# Patient Record
Sex: FEMALE | Race: WHITE | Employment: OTHER | URBAN - METROPOLITAN AREA
[De-identification: names, ages, dates, MRNs, and addresses within clinical notes are randomized per-mention and may not be internally consistent; named-entity substitution may affect disease eponyms.]

---

## 2020-01-01 ENCOUNTER — APPOINTMENT (OUTPATIENT)
Dept: GENERAL RADIOLOGY | Facility: HOSPITAL | Age: 75
DRG: 871 | End: 2020-01-01
Attending: INTERNAL MEDICINE
Payer: MEDICARE

## 2020-01-01 ENCOUNTER — APPOINTMENT (OUTPATIENT)
Dept: CT IMAGING | Facility: HOSPITAL | Age: 75
DRG: 871 | End: 2020-01-01
Attending: INTERNAL MEDICINE
Payer: MEDICARE

## 2020-01-01 ENCOUNTER — APPOINTMENT (OUTPATIENT)
Dept: GENERAL RADIOLOGY | Facility: HOSPITAL | Age: 75
DRG: 871 | End: 2020-01-01
Payer: MEDICARE

## 2020-01-01 ENCOUNTER — APPOINTMENT (OUTPATIENT)
Dept: GENERAL RADIOLOGY | Facility: HOSPITAL | Age: 75
DRG: 871 | End: 2020-01-01
Attending: EMERGENCY MEDICINE
Payer: MEDICARE

## 2020-01-01 ENCOUNTER — APPOINTMENT (OUTPATIENT)
Dept: GENERAL RADIOLOGY | Facility: HOSPITAL | Age: 75
DRG: 871 | End: 2020-01-01
Attending: NURSE PRACTITIONER
Payer: MEDICARE

## 2020-01-01 ENCOUNTER — APPOINTMENT (OUTPATIENT)
Dept: ULTRASOUND IMAGING | Facility: HOSPITAL | Age: 75
DRG: 871 | End: 2020-01-01
Attending: INTERNAL MEDICINE
Payer: MEDICARE

## 2020-01-01 ENCOUNTER — ANESTHESIA (OUTPATIENT)
Dept: MEDSURG UNIT | Facility: HOSPITAL | Age: 75
DRG: 871 | End: 2020-01-01
Payer: MEDICARE

## 2020-01-01 ENCOUNTER — APPOINTMENT (OUTPATIENT)
Dept: CT IMAGING | Facility: HOSPITAL | Age: 75
DRG: 871 | End: 2020-01-01
Attending: NURSE PRACTITIONER
Payer: MEDICARE

## 2020-01-01 ENCOUNTER — APPOINTMENT (OUTPATIENT)
Dept: INTERVENTIONAL RADIOLOGY/VASCULAR | Facility: HOSPITAL | Age: 75
DRG: 871 | End: 2020-01-01
Attending: INTERNAL MEDICINE
Payer: MEDICARE

## 2020-01-01 ENCOUNTER — ANESTHESIA EVENT (OUTPATIENT)
Dept: MEDSURG UNIT | Facility: HOSPITAL | Age: 75
DRG: 871 | End: 2020-01-01
Payer: MEDICARE

## 2020-01-01 ENCOUNTER — HOSPITAL ENCOUNTER (INPATIENT)
Dept: GENERAL RADIOLOGY | Facility: HOSPITAL | Age: 75
Discharge: HOME | DRG: 871 | End: 2020-01-01
Attending: HOSPITALIST
Payer: MEDICARE

## 2020-01-01 ENCOUNTER — HOSPITAL ENCOUNTER (INPATIENT)
Facility: HOSPITAL | Age: 75
LOS: 17 days | DRG: 871 | End: 2020-01-01
Attending: EMERGENCY MEDICINE | Admitting: HOSPITALIST
Payer: MEDICARE

## 2020-01-01 VITALS
OXYGEN SATURATION: 96 % | HEIGHT: 63 IN | BODY MASS INDEX: 20.71 KG/M2 | DIASTOLIC BLOOD PRESSURE: 42 MMHG | TEMPERATURE: 97 F | SYSTOLIC BLOOD PRESSURE: 128 MMHG | WEIGHT: 116.88 LBS

## 2020-01-01 DIAGNOSIS — J18.9 COMMUNITY ACQUIRED PNEUMONIA, UNSPECIFIED LATERALITY: Primary | ICD-10-CM

## 2020-01-01 DIAGNOSIS — R09.02 HYPOXIA: ICD-10-CM

## 2020-01-01 DIAGNOSIS — Z20.822 PERSON UNDER INVESTIGATION FOR COVID-19: ICD-10-CM

## 2020-01-01 DIAGNOSIS — N19 RENAL FAILURE, UNSPECIFIED CHRONICITY: ICD-10-CM

## 2020-01-01 PROCEDURE — XW033H5 INTRODUCTION OF TOCILIZUMAB INTO PERIPHERAL VEIN, PERCUTANEOUS APPROACH, NEW TECHNOLOGY GROUP 5: ICD-10-PCS | Performed by: HOSPITALIST

## 2020-01-01 PROCEDURE — 71045 X-RAY EXAM CHEST 1 VIEW: CPT | Performed by: INTERNAL MEDICINE

## 2020-01-01 PROCEDURE — 5A1D90Z PERFORMANCE OF URINARY FILTRATION, CONTINUOUS, GREATER THAN 18 HOURS PER DAY: ICD-10-PCS | Performed by: HOSPITALIST

## 2020-01-01 PROCEDURE — 99233 SBSQ HOSP IP/OBS HIGH 50: CPT | Performed by: INTERNAL MEDICINE

## 2020-01-01 PROCEDURE — 0W9B30Z DRAINAGE OF LEFT PLEURAL CAVITY WITH DRAINAGE DEVICE, PERCUTANEOUS APPROACH: ICD-10-PCS | Performed by: RADIOLOGY

## 2020-01-01 PROCEDURE — 99232 SBSQ HOSP IP/OBS MODERATE 35: CPT | Performed by: HOSPITALIST

## 2020-01-01 PROCEDURE — 74018 RADEX ABDOMEN 1 VIEW: CPT | Performed by: NURSE PRACTITIONER

## 2020-01-01 PROCEDURE — XW13325 TRANSFUSION OF CONVALESCENT PLASMA (NONAUTOLOGOUS) INTO PERIPHERAL VEIN, PERCUTANEOUS APPROACH, NEW TECHNOLOGY GROUP 5: ICD-10-PCS | Performed by: HOSPITALIST

## 2020-01-01 PROCEDURE — 76700 US EXAM ABDOM COMPLETE: CPT | Performed by: INTERNAL MEDICINE

## 2020-01-01 PROCEDURE — 99223 1ST HOSP IP/OBS HIGH 75: CPT | Performed by: NURSE PRACTITIONER

## 2020-01-01 PROCEDURE — 02HV33Z INSERTION OF INFUSION DEVICE INTO SUPERIOR VENA CAVA, PERCUTANEOUS APPROACH: ICD-10-PCS | Performed by: HOSPITALIST

## 2020-01-01 PROCEDURE — 3E033GC INTRODUCTION OF OTHER THERAPEUTIC SUBSTANCE INTO PERIPHERAL VEIN, PERCUTANEOUS APPROACH: ICD-10-PCS | Performed by: HOSPITALIST

## 2020-01-01 PROCEDURE — 71045 X-RAY EXAM CHEST 1 VIEW: CPT | Performed by: EMERGENCY MEDICINE

## 2020-01-01 PROCEDURE — 99291 CRITICAL CARE FIRST HOUR: CPT | Performed by: NURSE PRACTITIONER

## 2020-01-01 PROCEDURE — 99232 SBSQ HOSP IP/OBS MODERATE 35: CPT | Performed by: CLINICAL NURSE SPECIALIST

## 2020-01-01 PROCEDURE — 99231 SBSQ HOSP IP/OBS SF/LOW 25: CPT | Performed by: NURSE PRACTITIONER

## 2020-01-01 PROCEDURE — 71045 X-RAY EXAM CHEST 1 VIEW: CPT | Performed by: NURSE PRACTITIONER

## 2020-01-01 PROCEDURE — 99233 SBSQ HOSP IP/OBS HIGH 50: CPT | Performed by: HOSPITALIST

## 2020-01-01 PROCEDURE — 02HV33Z INSERTION OF INFUSION DEVICE INTO SUPERIOR VENA CAVA, PERCUTANEOUS APPROACH: ICD-10-PCS | Performed by: RADIOLOGY

## 2020-01-01 PROCEDURE — 93970 EXTREMITY STUDY: CPT | Performed by: INTERNAL MEDICINE

## 2020-01-01 PROCEDURE — 71275 CT ANGIOGRAPHY CHEST: CPT | Performed by: INTERNAL MEDICINE

## 2020-01-01 PROCEDURE — 99291 CRITICAL CARE FIRST HOUR: CPT | Performed by: INTERNAL MEDICINE

## 2020-01-01 PROCEDURE — 99223 1ST HOSP IP/OBS HIGH 75: CPT | Performed by: SURGERY

## 2020-01-01 PROCEDURE — 71250 CT THORAX DX C-: CPT | Performed by: INTERNAL MEDICINE

## 2020-01-01 PROCEDURE — 71045 X-RAY EXAM CHEST 1 VIEW: CPT | Performed by: HOSPITALIST

## 2020-01-01 PROCEDURE — 3E0436Z INTRODUCTION OF NUTRITIONAL SUBSTANCE INTO CENTRAL VEIN, PERCUTANEOUS APPROACH: ICD-10-PCS | Performed by: HOSPITALIST

## 2020-01-01 PROCEDURE — 99223 1ST HOSP IP/OBS HIGH 75: CPT | Performed by: INTERNAL MEDICINE

## 2020-01-01 PROCEDURE — 5A09357 ASSISTANCE WITH RESPIRATORY VENTILATION, LESS THAN 24 CONSECUTIVE HOURS, CONTINUOUS POSITIVE AIRWAY PRESSURE: ICD-10-PCS | Performed by: HOSPITALIST

## 2020-01-01 PROCEDURE — 74176 CT ABD & PELVIS W/O CONTRAST: CPT | Performed by: INTERNAL MEDICINE

## 2020-01-01 PROCEDURE — 5A0955Z ASSISTANCE WITH RESPIRATORY VENTILATION, GREATER THAN 96 CONSECUTIVE HOURS: ICD-10-PCS | Performed by: HOSPITALIST

## 2020-01-01 PROCEDURE — 99232 SBSQ HOSP IP/OBS MODERATE 35: CPT | Performed by: INTERNAL MEDICINE

## 2020-01-01 PROCEDURE — 3E033XZ INTRODUCTION OF VASOPRESSOR INTO PERIPHERAL VEIN, PERCUTANEOUS APPROACH: ICD-10-PCS | Performed by: HOSPITALIST

## 2020-01-01 PROCEDURE — 30233N1 TRANSFUSION OF NONAUTOLOGOUS RED BLOOD CELLS INTO PERIPHERAL VEIN, PERCUTANEOUS APPROACH: ICD-10-PCS | Performed by: HOSPITALIST

## 2020-01-01 RX ORDER — SODIUM CHLORIDE 450 MG/100ML
INJECTION, SOLUTION INTRAVENOUS CONTINUOUS
Status: DISCONTINUED | OUTPATIENT
Start: 2020-01-01 | End: 2020-01-01

## 2020-01-01 RX ORDER — ALBUTEROL SULFATE 90 UG/1
8 AEROSOL, METERED RESPIRATORY (INHALATION)
Status: DISCONTINUED | OUTPATIENT
Start: 2020-01-01 | End: 2020-01-01

## 2020-01-01 RX ORDER — ACETAMINOPHEN 325 MG/1
650 TABLET ORAL EVERY 6 HOURS PRN
Status: DISCONTINUED | OUTPATIENT
Start: 2020-01-01 | End: 2020-01-01

## 2020-01-01 RX ORDER — SODIUM CHLORIDE 9 MG/ML
INJECTION, SOLUTION INTRAVENOUS ONCE
Status: COMPLETED | OUTPATIENT
Start: 2020-01-01 | End: 2020-01-01

## 2020-01-01 RX ORDER — LIDOCAINE HYDROCHLORIDE 10 MG/ML
INJECTION, SOLUTION INFILTRATION; PERINEURAL
Status: COMPLETED
Start: 2020-01-01 | End: 2020-01-01

## 2020-01-01 RX ORDER — ACETAMINOPHEN 500 MG
1000 TABLET ORAL ONCE
Status: COMPLETED | OUTPATIENT
Start: 2020-01-01 | End: 2020-01-01

## 2020-01-01 RX ORDER — DOCUSATE SODIUM 100 MG/1
100 CAPSULE, LIQUID FILLED ORAL 2 TIMES DAILY
Status: DISCONTINUED | OUTPATIENT
Start: 2020-01-01 | End: 2020-01-01 | Stop reason: SDUPTHER

## 2020-01-01 RX ORDER — MORPHINE SULFATE 2 MG/ML
1 INJECTION, SOLUTION INTRAMUSCULAR; INTRAVENOUS EVERY 2 HOUR PRN
Status: DISCONTINUED | OUTPATIENT
Start: 2020-01-01 | End: 2020-01-01

## 2020-01-01 RX ORDER — DEXAMETHASONE SODIUM PHOSPHATE 4 MG/ML
6 VIAL (ML) INJECTION EVERY 12 HOURS
Status: DISCONTINUED | OUTPATIENT
Start: 2020-01-01 | End: 2020-01-01

## 2020-01-01 RX ORDER — MORPHINE SULFATE 2 MG/ML
2 INJECTION, SOLUTION INTRAMUSCULAR; INTRAVENOUS ONCE
Status: COMPLETED | OUTPATIENT
Start: 2020-01-01 | End: 2020-01-01

## 2020-01-01 RX ORDER — HEPARIN SODIUM 5000 [USP'U]/ML
INJECTION, SOLUTION INTRAVENOUS; SUBCUTANEOUS
Status: COMPLETED
Start: 2020-01-01 | End: 2020-01-01

## 2020-01-01 RX ORDER — MORPHINE SULFATE 2 MG/ML
1 INJECTION, SOLUTION INTRAMUSCULAR; INTRAVENOUS ONCE
Status: DISCONTINUED | OUTPATIENT
Start: 2020-01-01 | End: 2020-01-01

## 2020-01-01 RX ORDER — DEXAMETHASONE SODIUM PHOSPHATE 4 MG/ML
6 VIAL (ML) INJECTION DAILY
Status: DISCONTINUED | OUTPATIENT
Start: 2020-01-01 | End: 2020-01-01

## 2020-01-01 RX ORDER — SENNOSIDES 8.6 MG
8.6 TABLET ORAL 2 TIMES DAILY
Status: DISCONTINUED | OUTPATIENT
Start: 2020-01-01 | End: 2020-01-01

## 2020-01-01 RX ORDER — ATORVASTATIN CALCIUM 20 MG/1
20 TABLET, FILM COATED ORAL NIGHTLY
Status: DISCONTINUED | OUTPATIENT
Start: 2020-01-01 | End: 2020-01-01

## 2020-01-01 RX ORDER — MORPHINE SULFATE 4 MG/ML
INJECTION, SOLUTION INTRAMUSCULAR; INTRAVENOUS
Status: COMPLETED
Start: 2020-01-01 | End: 2020-01-01

## 2020-01-01 RX ORDER — MORPHINE SULFATE 2 MG/ML
1-2 INJECTION, SOLUTION INTRAMUSCULAR; INTRAVENOUS EVERY 2 HOUR PRN
Status: DISCONTINUED | OUTPATIENT
Start: 2020-01-01 | End: 2020-01-01 | Stop reason: SDUPTHER

## 2020-01-01 RX ORDER — MORPHINE SULFATE 4 MG/ML
4 INJECTION, SOLUTION INTRAMUSCULAR; INTRAVENOUS EVERY 2 HOUR PRN
Status: DISCONTINUED | OUTPATIENT
Start: 2020-01-01 | End: 2020-01-01

## 2020-01-01 RX ORDER — DEXTROSE MONOHYDRATE 25 G/50ML
50 INJECTION, SOLUTION INTRAVENOUS
Status: DISCONTINUED | OUTPATIENT
Start: 2020-01-01 | End: 2020-01-01

## 2020-01-01 RX ORDER — FAMOTIDINE 20 MG/1
20 TABLET ORAL 2 TIMES DAILY
Status: DISCONTINUED | OUTPATIENT
Start: 2020-01-01 | End: 2020-01-01 | Stop reason: ALTCHOICE

## 2020-01-01 RX ORDER — ONDANSETRON 2 MG/ML
4 INJECTION INTRAMUSCULAR; INTRAVENOUS EVERY 6 HOURS PRN
Status: DISCONTINUED | OUTPATIENT
Start: 2020-01-01 | End: 2020-01-01

## 2020-01-01 RX ORDER — HEPARIN SODIUM 1000 [USP'U]/ML
1.5 INJECTION, SOLUTION INTRAVENOUS; SUBCUTANEOUS AS NEEDED
Status: DISCONTINUED | OUTPATIENT
Start: 2020-01-01 | End: 2020-01-01

## 2020-01-01 RX ORDER — ENOXAPARIN SODIUM 100 MG/ML
0.5 INJECTION SUBCUTANEOUS EVERY 12 HOURS SCHEDULED
Status: DISCONTINUED | OUTPATIENT
Start: 2020-01-01 | End: 2020-01-01

## 2020-01-01 RX ORDER — LEVOTHYROXINE SODIUM 0.05 MG/1
50 TABLET ORAL
Status: DISCONTINUED | OUTPATIENT
Start: 2020-01-01 | End: 2020-01-01

## 2020-01-01 RX ORDER — PANTOPRAZOLE SODIUM 20 MG/1
20 TABLET, DELAYED RELEASE ORAL
Status: DISCONTINUED | OUTPATIENT
Start: 2020-01-01 | End: 2020-01-01 | Stop reason: SDUPTHER

## 2020-01-01 RX ORDER — SODIUM CHLORIDE 9 MG/ML
INJECTION, SOLUTION INTRAVENOUS CONTINUOUS
Status: DISCONTINUED | OUTPATIENT
Start: 2020-01-01 | End: 2020-01-01

## 2020-01-01 RX ORDER — BISACODYL 10 MG
10 SUPPOSITORY, RECTAL RECTAL
Status: DISCONTINUED | OUTPATIENT
Start: 2020-01-01 | End: 2020-01-01

## 2020-01-01 RX ORDER — PHENYLEPHRINE HCL IN 0.9% NACL 50MG/250ML
PLASTIC BAG, INJECTION (ML) INTRAVENOUS CONTINUOUS
Status: DISCONTINUED | OUTPATIENT
Start: 2020-01-01 | End: 2020-01-01

## 2020-01-01 RX ORDER — CHOLECALCIFEROL (VITAMIN D3) 25 MCG
1000 CAPSULE ORAL DAILY
COMMUNITY
Start: 2020-01-01

## 2020-01-01 RX ORDER — OMEPRAZOLE 20 MG/1
20 CAPSULE, DELAYED RELEASE ORAL
COMMUNITY
Start: 2020-01-01

## 2020-01-01 RX ORDER — HEPARIN SODIUM 5000 [USP'U]/ML
5000 INJECTION, SOLUTION INTRAVENOUS; SUBCUTANEOUS EVERY 8 HOURS SCHEDULED
Status: DISCONTINUED | OUTPATIENT
Start: 2020-01-01 | End: 2020-01-01

## 2020-01-01 RX ORDER — MORPHINE SULFATE 2 MG/ML
1 INJECTION, SOLUTION INTRAMUSCULAR; INTRAVENOUS EVERY 4 HOURS PRN
Status: DISCONTINUED | OUTPATIENT
Start: 2020-01-01 | End: 2020-01-01

## 2020-01-01 RX ORDER — GLYCOPYRROLATE 0.2 MG/ML
0.4 INJECTION, SOLUTION INTRAMUSCULAR; INTRAVENOUS
Status: DISCONTINUED | OUTPATIENT
Start: 2020-01-01 | End: 2020-01-01

## 2020-01-01 RX ORDER — METOPROLOL SUCCINATE 25 MG/1
25 TABLET, EXTENDED RELEASE ORAL DAILY
COMMUNITY
Start: 2020-01-01

## 2020-01-01 RX ORDER — METOPROLOL SUCCINATE 25 MG/1
25 TABLET, EXTENDED RELEASE ORAL
Status: DISCONTINUED | OUTPATIENT
Start: 2020-01-01 | End: 2020-01-01

## 2020-01-01 RX ORDER — MORPHINE SULFATE 2 MG/ML
2 INJECTION, SOLUTION INTRAMUSCULAR; INTRAVENOUS
Status: DISCONTINUED | OUTPATIENT
Start: 2020-01-01 | End: 2020-01-01

## 2020-01-01 RX ORDER — ENOXAPARIN SODIUM 100 MG/ML
30 INJECTION SUBCUTANEOUS DAILY
Status: DISCONTINUED | OUTPATIENT
Start: 2020-01-01 | End: 2020-01-01

## 2020-01-01 RX ORDER — DEXTROSE MONOHYDRATE 25 G/50ML
50 INJECTION, SOLUTION INTRAVENOUS ONCE
Status: COMPLETED | OUTPATIENT
Start: 2020-01-01 | End: 2020-01-01

## 2020-01-01 RX ORDER — LOSARTAN POTASSIUM 25 MG/1
25 TABLET ORAL DAILY
COMMUNITY
Start: 2020-01-01

## 2020-01-01 RX ORDER — POLYETHYLENE GLYCOL 3350 17 G/17G
17 POWDER, FOR SOLUTION ORAL DAILY PRN
Status: DISCONTINUED | OUTPATIENT
Start: 2020-01-01 | End: 2020-01-01

## 2020-01-01 RX ORDER — MORPHINE SULFATE 2 MG/ML
2 INJECTION, SOLUTION INTRAMUSCULAR; INTRAVENOUS EVERY 2 HOUR PRN
Status: DISCONTINUED | OUTPATIENT
Start: 2020-01-01 | End: 2020-01-01

## 2020-01-01 RX ORDER — CALCIUM GLUCONATE 94 MG/ML
2 INJECTION, SOLUTION INTRAVENOUS ONCE
Status: DISCONTINUED | OUTPATIENT
Start: 2020-01-01 | End: 2020-01-01

## 2020-01-01 RX ORDER — LOSARTAN POTASSIUM 25 MG/1
25 TABLET ORAL DAILY
Status: DISCONTINUED | OUTPATIENT
Start: 2020-01-01 | End: 2020-01-01

## 2020-01-01 RX ORDER — ACETAMINOPHEN 500 MG
TABLET ORAL
Status: COMPLETED
Start: 2020-01-01 | End: 2020-01-01

## 2020-01-01 RX ORDER — FUROSEMIDE 10 MG/ML
20 INJECTION INTRAMUSCULAR; INTRAVENOUS ONCE
Status: COMPLETED | OUTPATIENT
Start: 2020-01-01 | End: 2020-01-01

## 2020-01-01 RX ORDER — MAGNESIUM OXIDE 400 MG (241.3 MG MAGNESIUM) TABLET
400 TABLET ONCE
Status: COMPLETED | OUTPATIENT
Start: 2020-01-01 | End: 2020-01-01

## 2020-01-01 RX ORDER — MORPHINE SULFATE 4 MG/ML
6 INJECTION, SOLUTION INTRAMUSCULAR; INTRAVENOUS EVERY 2 HOUR PRN
Status: DISCONTINUED | OUTPATIENT
Start: 2020-01-01 | End: 2020-01-01

## 2020-01-01 RX ORDER — FUROSEMIDE 10 MG/ML
60 INJECTION INTRAMUSCULAR; INTRAVENOUS ONCE
Status: COMPLETED | OUTPATIENT
Start: 2020-01-01 | End: 2020-01-01

## 2020-01-01 RX ORDER — ALBUTEROL SULFATE 90 UG/1
4 AEROSOL, METERED RESPIRATORY (INHALATION)
Status: DISCONTINUED | OUTPATIENT
Start: 2020-01-01 | End: 2020-01-01

## 2020-01-01 RX ORDER — LORAZEPAM 2 MG/ML
INJECTION INTRAMUSCULAR
Status: COMPLETED
Start: 2020-01-01 | End: 2020-01-01

## 2020-01-01 RX ORDER — DEXMEDETOMIDINE HYDROCHLORIDE 4 UG/ML
INJECTION, SOLUTION INTRAVENOUS CONTINUOUS
Status: DISCONTINUED | OUTPATIENT
Start: 2020-01-01 | End: 2020-01-01

## 2020-01-01 RX ORDER — HYDRALAZINE HYDROCHLORIDE 20 MG/ML
20 INJECTION INTRAMUSCULAR; INTRAVENOUS EVERY 4 HOURS PRN
Status: DISCONTINUED | OUTPATIENT
Start: 2020-01-01 | End: 2020-01-01 | Stop reason: ALTCHOICE

## 2020-01-01 RX ORDER — SODIUM BICARBONATE 150 MEQ/1,000 ML IN DEXTROSE 5 % INTRAVENOUS
100 SOLUTION CONTINUOUS
Status: DISCONTINUED | OUTPATIENT
Start: 2020-01-01 | End: 2020-01-01

## 2020-01-01 RX ORDER — SIMVASTATIN 40 MG
40 TABLET ORAL NIGHTLY
COMMUNITY
Start: 2020-01-01

## 2020-01-01 RX ORDER — LEVOTHYROXINE SODIUM 0.05 MG/1
50 TABLET ORAL DAILY
COMMUNITY
Start: 2020-01-01

## 2020-01-01 RX ORDER — ACETAMINOPHEN 500 MG
500 TABLET ORAL EVERY 6 HOURS PRN
COMMUNITY

## 2020-01-01 RX ORDER — MORPHINE SULFATE 2 MG/ML
INJECTION, SOLUTION INTRAMUSCULAR; INTRAVENOUS
Status: DISPENSED
Start: 2020-01-01 | End: 2020-01-01

## 2020-01-01 RX ORDER — LORAZEPAM 2 MG/ML
1 INJECTION INTRAMUSCULAR
Status: DISCONTINUED | OUTPATIENT
Start: 2020-01-01 | End: 2020-01-01

## 2020-09-17 PROBLEM — D64.9 ANEMIA: Status: ACTIVE | Noted: 2020-01-01

## 2020-09-17 PROBLEM — R09.02 HYPOXIA: Status: ACTIVE | Noted: 2020-01-01

## 2020-09-17 PROBLEM — Z20.822 PERSON UNDER INVESTIGATION FOR COVID-19: Status: ACTIVE | Noted: 2020-01-01

## 2020-09-17 PROBLEM — E87.3 RESPIRATORY ALKALOSIS: Status: ACTIVE | Noted: 2020-01-01

## 2020-09-17 PROBLEM — J18.9 COMMUNITY ACQUIRED PNEUMONIA: Status: ACTIVE | Noted: 2020-01-01

## 2020-09-17 PROBLEM — E87.2 METABOLIC ACIDOSIS: Status: ACTIVE | Noted: 2020-01-01

## 2020-09-17 PROBLEM — J18.9 COMMUNITY ACQUIRED PNEUMONIA, UNSPECIFIED LATERALITY: Status: ACTIVE | Noted: 2020-01-01

## 2020-09-17 PROBLEM — N19 RENAL FAILURE, UNSPECIFIED CHRONICITY: Status: ACTIVE | Noted: 2020-01-01

## 2020-09-17 PROBLEM — E87.5 HYPERKALEMIA: Status: ACTIVE | Noted: 2020-01-01

## 2020-09-17 PROBLEM — E87.3 METABOLIC ALKALOSIS: Status: ACTIVE | Noted: 2020-01-01

## 2020-09-17 NOTE — ED INITIAL ASSESSMENT (HPI)
Pt to ED with , reports of feeling sick with cough for about one month, increased SOB today. Initial assessment difficult due to language barrier. Pt 74% on roomair. Moved to triage holding bed to be placed on oxygen. Charge RN made aware.

## 2020-09-18 PROBLEM — E87.2 NORMAL ANION GAP METABOLIC ACIDOSIS: Status: ACTIVE | Noted: 2020-01-01

## 2020-09-18 PROBLEM — N17.9 ACUTE KIDNEY INJURY SUPERIMPOSED ON CKD (HCC): Status: ACTIVE | Noted: 2020-01-01

## 2020-09-18 PROBLEM — N18.9 ACUTE KIDNEY INJURY SUPERIMPOSED ON CKD (HCC): Status: ACTIVE | Noted: 2020-01-01

## 2020-09-18 NOTE — PLAN OF CARE
Able to wean patient off BiPap to nasal cannula 4L this morning. VSS currently. Dry cough. PCR positive for COVID19. Convalescent plasma ordered by ID. Family and patient updated on plan of care. Very poor appetite, refusing dinner.  Report given to [facility]

## 2020-09-18 NOTE — ED PROVIDER NOTES
Patient Seen in: [facility] Emergency Department      History   Patient presents with:  Dyspnea LYNETTE SOB  Cough/URI    Stated Complaint: testing    HPI    Patient is a 50-year-old woman presents with fevers and hypoxia.   Patient says she has been manny Rales/rhonchi. Equal breath sounds bilaterally  Cardiac: No tachycardia. No murmurs. Regular rate and rhythm. Abdomen: Soft and nontender throughout. No rebound or guarding  Extremities: No clubbing/cyanosis/edema.   Skin: No rashes, no pallor  Neuro: created for panel order CBC WITH DIFFERENTIAL WITH PLATELET.   Procedure                               Abnormality         Status                     ---------                               -----------         ------                     CBC W/ DIFFERENTIAL[ patient. A total of 35 minutes of critical care time (exclusive of billable procedures) was administered to manage the patient's respiratory instability due to bilateral pneumonia.   This involved direct patient intervention, complex decision m

## 2020-09-18 NOTE — PROGRESS NOTES
ICU  Critical Care APRN Progress Note    NAME: Vianey Anne - ROOM: 814/362-N - MRN: [id number] - Age: 75year old - :10/16/1945    History Of Present Illness:  Vianey Anne is a 75year old [address] speaking female with PMHx significant for HTN, HLD, history. Social Hx:  Social History    Tobacco Use      Smoking status: Never Smoker      Smokeless tobacco: Never Used    Alcohol use: Not on file    Drug use: Not on file    Family Hx:  No family history on file.     Review of Systems:   A comprehensiv 09/17/2020     09/17/2020    CO2 21.0 09/17/2020     09/17/2020    CA 8.6 09/17/2020    ALB 2.8 09/17/2020    ALKPHO 93 09/17/2020    BILT 0.8 09/17/2020    TP 7.8 09/17/2020    AST 53 09/17/2020    ALT 23 09/17/2020     Assessment/Plan:  1. on-call, Shirlene Angeles 3 NP  Melissa 227: [id number]  [facility]: [id number]    A total of 35 minutes of critical care time (exclusive of billable procedures) was administered.  This involved direct patient intervention, complex deci

## 2020-09-18 NOTE — PROGRESS NOTES
Pharmacy Note: Renal dose adjustment of Armstrong Leak is a 75year old female who has been prescribed Merrem 500mg every 8 hours.   CrCl is 14.9 ml/min so the dose has been adjusted  to 500mg every 12 hours per hospital renal dose adjustment prot

## 2020-09-18 NOTE — PROGRESS NOTES
09/18/20 0330   BiPAP   $ RT Standby Charge (per 15 min) 1   Device V60   BiPAP / CPAP CE# V246   Mode Spontaneous/Timed   Interface Full face mask   Mask Size Small   Control Settings   Set Rate 16 breaths/min   Set IPAP 10   Set EPAP 5   Oxygen Percen

## 2020-09-18 NOTE — H&P
[facility]  History and Physical     Cecelia Rao Patient Status:  Emergency    10/16/1945 MRN [id number]   Location [facility] [facility] Attending Lisa Frederick MD   Hosp Day # 1 PCP No primary care provider on file. pulses. Chest and Back: No tenderness or deformity. Abdomen: Soft, nontender, nondistended. Positive bowel sounds. No rebound, guarding or organomegaly. Neurologic: No focal neurological deficits. CNII-XII grossly intact.   Musculoskeletal: Moves all e

## 2020-09-18 NOTE — PROGRESS NOTES
[facility] note: Duplicate Proton Pump Inhibitor with Histamine 2 blocker. Maria A Sexton is a 75year old patient who has been prescribed both famotidine (Pepcid)  And pantoprazole (Protonix).   Pepcid was discontinued per P&T approved protocol for du

## 2020-09-18 NOTE — CONSULTS
[facility]  Report of Consultation    Calla Son Patient Status:  Inpatient    10/16/1945 MRN [id number]   [facility] 4SW-A Attending Amina Padilla MD   Hosp Day # 1 PCP No primary care provider on file.      Reason for Consultatio injection 6 mg, 6 mg, Intravenous, Daily  •  Heparin Sodium (Porcine) 5000 UNIT/ML injection 5,000 Units, 5,000 Units, Subcutaneous, Q8H [facility]  •  acetaminophen (TYLENOL) tab 650 mg, 650 mg, Oral, Q6H PRN  •  docusate sodium (COLACE) cap 100 mg, 100 mg, Oral 143 09/18/2020    K 4.7 09/18/2020     09/18/2020    CO2 21.0 09/18/2020     09/18/2020    CA 7.9 09/18/2020    ALB 2.2 09/18/2020    ALKPHO 70 09/18/2020    BILT 0.5 09/18/2020    TP 6.1 09/18/2020    AST 29 09/18/2020    ALT 18 09/18/2020

## 2020-09-18 NOTE — PLAN OF CARE
Received patient from the ED on BiPAP. Alert. Nepali speaking only. See flowsheet for further assessment. BiPAP. SR/SB. VSS. NPO while on BiPAP. Renal diet. Accuchecks. Straight cath x1 -- UA and culture sent.   Bedrest.

## 2020-09-18 NOTE — DIETARY NOTE
[address] [facility] [address] [facility]    Pt does not meet malnutrition criteria at this time. NUTRITION DIAGNOSIS/PROBLEM:    Increased nutrient needs related to acute illness as evidenced by COVID-19. NUTRITION INTERVENTION:  1.  Meal and S +/1-2 lb throughout length of stay    MEDICATIONS:  Decadron, IV abx, Insulin, Pepcid    LABS:  Glu:163, BUN:37, Cr:2.16, GFR:22, CRP:18.80    Pt is at moderate nutrition risk    FOLLOW-UP DATE:   9/23/2020    Lucrezia Libman MS, RD, LDN  Clinical Dietitia

## 2020-09-18 NOTE — ED NOTES
Pt transferred to room C7 per charge RN. Paramedic tech Ratna Stewart awaiting in room for bedside ultrasound. Was able to establish 22 gauge IV in left forearm but was unable to obtain blood or cultures.

## 2020-09-18 NOTE — ED NOTES
[facility] [place] daughter of patient can be reached at [phone]. Unable to verify patients home medications, yany states that she will attempt to find out home medications and call RN back.

## 2020-09-18 NOTE — CONSULTS
INFECTIOUS DISEASE CONSULTATION    Gale Palmer Patient Status:  Inpatient    10/16/1945 MRN [id number]   [facility] 4SW-A Attending Ban Bauer MD   Hosp Day # 1 PCP No primary care MBP, 500 mg, Intravenous, Q12H  •  cholecalciferol (VITAMIN D3) cap/tab 1,000 Units, 1,000 Units, Oral, Daily  •  Insulin Aspart Pen (NOVOLOG) 100 UNIT/ML flexpen 1-10 Units, 1-10 Units, Subcutaneous, Q6H  •  hydrALAzine HCl (APRESOLINE) injection 20 mg, 2 Rfl:         Review of Systems:    A comprehensive 10 point review of systems was completed. Pertinent positives and negatives noted in the the HPI. Physical Exam:    General: No acute distress. Alert and oriented x 3.   Vital signs: Temp:  [98 °F (36 No results found for this visit on 09/17/20.       Imaging:  CXR reviewed  Established Problem list:  Patient Active Problem List:     Hyperkalemia     Anemia     Normal anion gap metabolic acidosis     Metabolic alkalosis     Respiratory alkalosis     Co

## 2020-09-18 NOTE — PAYOR COMM NOTE
--------------  ADMISSION REVIEW     Lindsey Cesar MA [facility]  Subscriber #:  [id number]  Authorization Number: [id number]    Admit date: 9/17/20  Admit time: 2344       Admitting Physician: Tawnya Azar MD  Attending Physician:  Mavis Blas MD  Primary Car SpO2 100%   BMI 25.69 kg/m²      Physical Exam  General: Patient is ill-appearing 54-year-old woman dyspneic and hypoxic on arrival, very frail and thin appearing  HEENT: Normal cephalic atraumatic. Nonicteric sclera. Dry mucous membranes.   No meningismu within normal limits    Narrative:     BiPAP 10/+5 100%     CBC W/ DIFFERENTIAL - Abnormal; Notable for the following components:    HGB 11.7 (*)     RDW 16.9 (*)     RDW-SD 57.6 (*)     Lymphocyte Absolute 0.62 (*)     All other components within normal l 9/18/2020 12:53 AM     Author:  Lupe Bunch DO Service:  — Author Type:  Physician    Filed:  9/18/2020 12:53 AM Date of Service:  9/17/2020  9:23 PM Status:  Signed    :  Lupe Bunch DO (Physician)     [facility] affect.     Diagnostic Data:      Labs:  Recent Labs   Lab 09/17/20 1933   WBC 8.1   HGB 11.7*   MCV 92.8   .0     Lab 09/17/20 1933   *   BUN 44*   CREATSERUM 2.69*   GFRAA 19*   GFRNAA 17*   CA 8.6   ALB 2.8*      K 5.3*      C Lab 09/17/20  1933 09/18/20  0030 09/18/20  0434   CRP 21.00*  --  18.80*   ARTEM  --  443.2* 464.1*   LDH  --  330* 304*   DDIMER 2.20*  --  1.57*      ASSESSMENT/PLAN:  1.  COVID-19 pneumonia  Lives with  and daughter(age 40)  ---daughter works at — — 71 — 98 % — —   09/17/20 2130 99/51 — — 71 22 100 % — —   09/17/20 2030 110/51 — — 89 (!) 28 100 % — —   09/17/20 2000 108/54 — — 93 (!) 30 99 % — —   09/17/20 1947 — — — 102 — 99 % — —   09/17/20 1914 — — — 102 (!) 28 92 % — —   09/17/20 1854 129/59 ( DAY:  acetaminophen (TYLENOL EXTRA STRENGTH) tab 1,000 mg     Date Action Dose Route User    9/17/2020 1944 Given 1000 mg Oral Christina Swain, RN      Albuterol Sulfate  (90 Base) MCG/ACT inhaler 8 puff     Date Action Dose Route User    9/18/2020 RN      sodium chloride 0.9% IV bolus 1,974 mL     Date Action Dose Route User    9/17/2020 2037 New Bag 1974 mL Intravenous Claudette Cordia, RN          REVIEWER COMMENTS:     FOR REVIEW/APPROVAL OF INPT ICU ADMISSION

## 2020-09-18 NOTE — PROGRESS NOTES
[facility]  Progress Note     Cecelia Rao Patient Status:  Inpatient    10/16/1945 MRN [id number]   [facility] 4SW-A Attending Maura Almazan MD   Hosp Day # 1 PCP No primary care provider on file.      Chief Complaint: PNA    S: sodium  100 mg Oral BID   • Albuterol Sulfate HFA  8 puff Inhalation 6 times per day       ASSESSMENT / PLAN:     1. COVID PNA w/ acute hypoxic resp failure   1. Steroids  2. Trend inflammatory markers  3. ID Cs  2. SEpsis due to above   3. RAPHAEL on CKD  4.

## 2020-09-18 NOTE — CONSULTS
Art Maldonado [address] [facility]  Pulmonary/Critical Care Consult Note  [facility]  Report of Consultation    Mica Thao Patient Status:  Inpatient    10/16/1945 MRN [id number]   [facility] 4SW-A Attending Merlyn Arellano HCl, sodium chloride 0.9%, acetaminophen, PEG 3350, bisacodyl    Review of Systems: WILFRID due to language and hearing barriers    Vital signs in last 24 hours:  Patient Vitals for the past 24 hrs:   BP Temp Temp src Pulse Resp SpO2 Height Weight   09/18/20 1 10/5/30% with sats 98%  PSYCH: Normal mood and affect, alert, appears oriented but very difficult to communicate due to hearing and language barriers  NEURO: CN 2-12 grossly intact, no focal deficits appreciated  HEENT: Atraumatic, normocephalic, EOMI, no Negative   KETUR Negative   BLOODURINE Negative   PHURINE 5.0   PROUR 30 *   UROBILINOGEN <2.0   NITRITE Negative   LEUUR Negative   WBCUR 1-4   RBCUR 0-2   BACUR Rare*   EPIUR None Seen       Radiology:     Xr Chest Ap Portable  (cpt=71045)    Result Date

## 2020-09-18 NOTE — PHYSICAL THERAPY NOTE
PT consult received per Functional Mobility Score. Per RN, pt is not appropriate at this time. Will continue to follow.

## 2020-09-19 NOTE — PROGRESS NOTES
VSS. NC 6L. Lungs are diminished, with some crackles in the bases, improved after lasix. See flowsheets for full assessment.  Patient is mostly [address] speaking, utilized  to clearly communicate what her discomfort was about, she feels the need to

## 2020-09-19 NOTE — PROGRESS NOTES
Assumed care at [address] [place] [address]. Alert. See flowsheet for further assessment. Stateless speaking --  used. Received on 5L NC. Placed on BiPAP d/t SOB and tachypneic -- patient states breathing felt better with BiPAP on.   Spoke with nephrology -- lasix IV

## 2020-09-19 NOTE — PROGRESS NOTES
[facility]                INFECTIOUS DISEASE PROGRESS NOTE    Tee Guardian Patient Status:  Inpatient    10/16/1945 MRN [id number]   [facility] 4SW-A Attending Carlos Lopez MD   Hosp Day # 2 PCP No primary care provider on file. CO2 21.0 21.0 21.0   ALKPHO 93 70 68   AST 53* 29 27   ALT 23 18 18   BILT 0.8 0.5 0.5   TP 7.8 6.1* 6.4       No results found for: [facility] Encounter on 09/17/20   1.  BLOOD CULTURE     Status: None (Preliminary result)    Collect

## 2020-09-19 NOTE — PROGRESS NOTES
[facility]  Progress Note    Mica Thao Patient Status:  Inpatient    10/16/1945 MRN [id number]   [facility] 4SW-A Attending Kristen Patel MD   Hosp Day # 2 PCP No primary care provider on file.      Subjective:  Mica Thao is a( CREATSERUM 2.69* 2.16* 1.98*     Recent Labs   Lab 09/17/20  1933   PTP 13.4   INR 0.99       Cultures: Admission blood cultures remain negative SARS PCR positive    Radiology:  Us Venous Doppler Leg Bilat - Diag Img (cpt=93970)    Result Date: 9/18/2020 renal failure  · Elevated inflammatory markers and d dimer--increasing numbers will expand anticoagulation as d-dimer is rising  · Anemia, normocytic  · DM  · HTN    Plan:  · Increase anticoagulation  · Waiting final cultures may be able to de-escalate ant

## 2020-09-19 NOTE — PROGRESS NOTES
[facility]  Progress Note     Zoe Son Patient Status:  Inpatient    10/16/1945 MRN [id number]   [facility] 4SW-A Attending Amina Padilla MD   Hosp Day # 2 PCP No primary care provider on file.      Chief Complaint: PNA    S: Q8H [address]   • docusate sodium  100 mg Oral BID   • Albuterol Sulfate HFA  8 puff Inhalation 6 times per day       ASSESSMENT / PLAN:     1. COVID PNA w/ acute hypoxic resp failure   1. Steroids  2. Trend inflammatory markers  3. ID Cs  2.  RAPHAEL on CKD  1. impr

## 2020-09-19 NOTE — PROGRESS NOTES
[facility]  Nephrology Progress Note    Peg Huffman Patient Status:  Inpatient    10/16/1945 MRN [id number]   [facility] 4SW-A Attending Dano Back MD   Hosp Day # 2 PCP No primary care provider on file.        SUBJECTIVE:  Remains 15 g, 15 g, Oral, Q15 Min PRN    Or    •  Glucose-Vitamin C (DEX-4) chewable tab 4 tablet, 4 tablet, Oral, Q15 Min PRN    Or    •  dextrose 50 % injection 50 mL, 50 mL, Intravenous, Q15 Min PRN    Or    •  glucose (DEX4) oral liquid 30 g, 30 g, Oral, Q15 M hypoxemic resp failure- due to pneumonia. R/o COVID. On meropenem/azithro/decadron. Poss volume component as well and will give IV lasix this AM     #3. HTN- follow BP and adjust meds prn.   IV hydralazine for now     #4  Bradycardia- follow          Da

## 2020-09-19 NOTE — PROGRESS NOTES
Overnight, placed patient on bipap (10/5/40%) due to some noticed shortness of breath. Patient did well with bipap on overnight. MDI albuterol inhaler given inline and tolerated well. No other changes overnight with patient. RT to continue to monitor.

## 2020-09-20 NOTE — PROGRESS NOTES
[facility]  Progress Note    Benson Morel Patient Status:  Inpatient    10/16/1945 MRN [id number]   [facility] 4SW-A Attending Lisandro Morales MD   [facility] Day # 3 PCP No primary care provider on file.      Subjective:  Benson Morel is Recent Labs   Lab 09/17/20 1933   PTP 13.4   INR 0.99       Cultures: Blood cultures remain negative    Radiology:  Xr Chest Ap Portable  (cpt=71045)    Result Date: 9/20/2020  CONCLUSION:  Essentially stable chest.  A few patchy areas of airspace opa

## 2020-09-20 NOTE — PROGRESS NOTES
[facility]  Nephrology Progress Note    Sofia Fly Patient Status:  Inpatient    10/16/1945 MRN [id number]   [facility] 4SW-A Attending Soniya Tyler MD   Hosp Day # 3 PCP No primary care provider on file.        SUBJECTIVE:  Looks b Recent Labs   Lab 09/18/20  2228 09/19/20  0821 09/19/20  1145 09/19/20  1702 09/19/20  2207   PGLU 175* 184* 203* 195* 200*       Meds:     •  0.9% NaCl infusion, , Intravenous, Continuous    •  Albuterol Sulfate  (90 Base) MCG/ACT inhaler 4 longstanding CKD due to DM/HTN with b/l creat typically 1.8 mg/dl or so. RAPHAEL in setting of sepsis/decr renal perfusion and creat had improved but mildly up today. May be intravascularly vol depleted with ongoing poor intake. Adjust IVF     #2.   Acute hy

## 2020-09-20 NOTE — PLAN OF CARE
Assumed care after RN report; pt resting in bed. Pt is A&Ox3; follow commands. Hard to communicate w/ pt; she's Bengali speaking only. Pt speaks too slowly/quietly for  tablet; and it's hard to speak on  line w/ respirator mask on.  Othe

## 2020-09-20 NOTE — PLAN OF CARE
Patient states she feels better than days prior. Denies pain, and is having incontinent BMs on bed pad. Appears to be more comfortable since having a BM. Patient is still refusing food this morning.  Family told [address] Reema Kelsey Lomas nurse that they would drop off food from

## 2020-09-20 NOTE — PROGRESS NOTES
[facility]  Progress Note     Collin James Patient Status:  Inpatient    10/16/1945 MRN [id number]   [facility] 4SW-A Attending Chanell Villeda MD   Hosp Day # 3 PCP No primary care provider on file.      Chief Complaint: PNA    S: atorvastatin  20 mg Oral Nightly   • Insulin Aspart Pen  1-10 Units Subcutaneous TID CC   • dexamethasone Sodium Phosphate  6 mg Intravenous Daily   • Heparin Sodium (Porcine)  5,000 Units Subcutaneous Q8H [address]   • docusate sodium  100 mg Oral BID       ASS

## 2020-09-20 NOTE — PROGRESS NOTES
[facility]                INFECTIOUS DISEASE PROGRESS NOTE    Julieta Yusuf Patient Status:  Inpatient    10/16/1945 MRN [id number]   [facility] 4SW-A Attending Cyrus Boo MD   Hosp Day # 3 PCP No primary care provider on file. 21.0 21.0   ALKPHO 93 70 68  --    AST 53* 29 27  --    ALT 23 18 18  --    BILT 0.8 0.5 0.5  --    TP 7.8 6.1* 6.4  --        No results found for: [facility] Encounter on 09/17/20   1.  BLOOD CULTURE     Status: None (Preliminary resu

## 2020-09-21 NOTE — PHYSICAL THERAPY NOTE
PHYSICAL THERAPY EVALUATION - INPATIENT     Room Number: 458/458-A  Evaluation Date: 9/21/2020  Type of Evaluation: Initial  Physician Order: PT Eval and Treat    Presenting Problem: COVID PNA  Reason for Therapy: Mobility Dysfunction and Discharge P Restriction: None                PAIN ASSESSMENT  Ratin  Location: denies       COGNITION  · Overall Cognitive Status:  Impaired  · Orientation Level:  oriented to place and oriented to person  · Following Commands:  follows one-step commands inconsist railing?: Total       AM-PAC Score:  Raw Score: 13   Approx Degree of Impairment: 64.91%   Standardized Score (AM-PAC Scale): 36.74   CMS Modifier (G-Code): CL    FUNCTIONAL ABILITY STATUS  Gait Assessment   Gait Assistance: Not tested  Distance (ft): 0 RECOMMENDATIONS  PT Discharge Recommendations: Sub-acute rehabilitation(ELOS 11-13 days)    PLAN  PT Treatment Plan: Bed mobility; Endurance; Energy conservation;Patient education; Family education;Gait training;Strengthening;Transfer training;Balance trainin

## 2020-09-21 NOTE — PROGRESS NOTES
[facility]  Nephrology Progress Note    Sally Good Patient Status:  Inpatient    10/16/1945 MRN [id number]   [facility] 4SW-A Attending Pawel Bush MD   Hosp Day # 4 PCP No primary care provider on file.        SUBJECTIVE:  Remains AST 53*  --  29 27  --    ALB 2.8*  --  2.2* 2.2*  --    LDH  --  330* 304* 329* 354*       Recent Labs   Lab 09/20/20  0805 09/20/20  1155 09/20/20  1651 09/20/20  2147 09/21/20  0856   PGLU 186* 180* 179* 147* 146*       Meds:     •  0.45% NaCl infusio Rectal, Daily PRN          Impression/Plan:         #1. RAPHAEL/CKD III- has had longstanding CKD due to DM/HTN with b/l creat typically 1.8 mg/dl or so. RAPHAEL in setting of sepsis/decr renal perfusion and creat again at baseline with intravenous fluids.   Cont

## 2020-09-21 NOTE — PLAN OF CARE
RECEIVED PT FOLLOWING AM REPORT. PT IS RESTING IN BED COMFORTABLY. WORKED WITH PTOT THIS AM. DESAT WITH SITTING ON SIDE OF BED, SO PUT BACK IN BED AND O2 REQUIREMENTS INCREASED, BUT ABLE TO WEAN DOWN BACK TO 5L-WILL CONTINUE TO MONITOR. DECREASED APPETITE.

## 2020-09-21 NOTE — PROGRESS NOTES
[facility]  Progress Note    Juan Pablo Lazo Patient Status:  Inpatient    10/16/1945 MRN [id number]   [facility] 4SW-A Attending Akira Jimenez MD   Hosp Day # 4 PCP No primary care provider on file.      Subjective:  Juan Pablo Lazo is GFRNAA 24* 21* 26*   CA 8.1* 8.2* 7.8*    148* 147*   K 4.8 4.1 3.8   * 123* 120*   CO2 21.0 21.0 22.0     Lab Results   Component Value Date    INR 0.99 09/17/2020     COVID-19 Lab Results    COVID-19  Lab Results   Component Value Date    C numbers will expand anticoagulation as d-dimer is rising  · Anemia, normocytic  · DM  · HTN    Plan:  · Continue close monitoring of respiratory status  · Continue empiric abx, meropenem day 4 (PCN allergy)  · Wean o2 for sats >89%, weaned to 5L  · continu

## 2020-09-21 NOTE — OCCUPATIONAL THERAPY NOTE
OCCUPATIONAL THERAPY EVALUATION - INPATIENT     Room Number: 458/458-A  Evaluation Date: 9/21/2020  Type of Evaluation: Initial  Presenting Problem: COVID-19; pneumonia    Physician Order: IP Consult to Occupational Therapy  Reason for Therapy: ADL/IADL Dy needed(monitor O2 sats)  Fall Risk: High fall risk    WEIGHT BEARING RESTRICTION  Weight Bearing Restriction: None                PAIN ASSESSMENT  Ratin  Location: denies       COGNITION  Alert  Oriented x self, \"hospital\"; disoriented to month, year answers. Patient cooperative with all tasks presented, however requires increased time. Patient transferred supine to EOB with min A. While sitting EOB, patient asking for a bucket d/t feeling she was going to vomit.   Patient's RR up to 30's and o2 sats Expanded review of history including review of medical or therapy record   Specific performance deficits impacting engagement in ADL/IADL MODERATE  3 - 5 performance deficits   Client Assessment/Performance Deficits MODERATE - Comorbidities and min to mod

## 2020-09-21 NOTE — PROGRESS NOTES
[facility]                INFECTIOUS DISEASE PROGRESS NOTE    Radha Suleiman Patient Status:  Inpatient    10/16/1945 MRN [id number]   [facility] 4SW-A Attending Jayna Trujillo MD   Hosp Day # 4 PCP No primary care provider on file. 144 148* 147*   K 5.3* 4.7 4.8 4.1 3.8    118* 117* 123* 120*   CO2 21.0 21.0 21.0 21.0 22.0   ALKPHO 93 70 68  --   --    AST 53* 29 27  --   --    ALT 23 18 18  --   --    BILT 0.8 0.5 0.5  --   --    TP 7.8 6.1* 6.4  --   --        No results foun

## 2020-09-21 NOTE — PROGRESS NOTES
[facility]  Progress Note     Vonkristine Pea Patient Status:  Inpatient    10/16/1945 MRN [id number]   [facility] 4SW-A Attending Leonard De Souza MD   Hosp Day # 4 PCP No primary care provider on file.      Chief Complaint: sob and feve (A) (based on SCr of 1.89 mg/dL (H)).     Recent Labs   Lab 09/17/20 1933   PTP 13.4   INR 0.99       Recent Labs   Lab 09/17/20 1933 09/18/20  0030 09/18/20  0434   TROP <0.045 <0.045 <0.045     --   --             Imaging: Imaging data reviewed in

## 2020-09-21 NOTE — PLAN OF CARE
Assumed care after RN report; pt resting in bed. A&Ox3; follows commands. Denies pain. Afebrile. SR per monitor. Hydralazine given x1 for HTN. [place] w/ adequate output.  Very hard to communicate w/ pt; Scottish speaking only/too soft spoken for  li

## 2020-09-22 NOTE — PROGRESS NOTES
[facility]  Progress Note    Manny Ferguson Patient Status:  Inpatient    10/16/1945 MRN [id number]   [facility] 4SW-A Attending Yonny Oconnor MD   Hosp Day # 5 PCP No primary care provider on file.      Subjective:  Manny Ferguson is * 120* 85   BUN 51* 44* 33*   CREATSERUM 2.27* 1.89* 1.57*   GFRAA 24* 29* 37*   GFRNAA 21* 26* 32*   CA 8.2* 7.8* 7.9*   * 147* 144   K 4.1 3.8 3.5   * 120* 119*   CO2 21.0 22.0 19.0*     Lab Results   Component Value Date    INR 0.99 COVID pneumonia, possible bacterial superinfection  · Lactic acidosis resolved  · Acute on chronic renal failure-hold on further diuresis  · Elevated inflammatory markers and d dimer--increasing numbers will expand anticoagulation as d-dimer is rising  · A

## 2020-09-22 NOTE — PROGRESS NOTES
[facility]                INFECTIOUS DISEASE PROGRESS NOTE    Kareen Rodriguez Patient Status:  Inpatient    10/16/1945 MRN [id number]   [facility] 4SW-A Attending Yun Cross MD   Hosp Day # 5 PCP No primary care provider on file. 8. 1* 8.2* 7.8* 7.9*   ALB 2.2* 2.2*  --   --  2.2*    144 148* 147* 144   K 4.7 4.8 4.1 3.8 3.5   * 117* 123* 120* 119*   CO2 21.0 21.0 21.0 22.0 19.0*   ALKPHO 70 68  --   --  88   AST 29 27  --   --  47*   ALT 18 18  --   --  28   BILT 0.5 0.

## 2020-09-22 NOTE — CM/SW NOTE
09/22/20 1600   CM/SW Referral Data   Referral Source Social Work (self-referral)   Reason for Referral Discharge planning   Informant Children  (dtr [place])   Social History   Recreational Drug/Alcohol Use n   Major Changes Last 6 Months n   Domestic/P

## 2020-09-22 NOTE — DIETARY NOTE
1230 [facility] ASSESSMENT    Pt does not meet malnutrition criteria at this time.     NUTRITION DIAGNOSIS/PROBLEM:    Inadequate energy intake related to poor appetite and physiologic causes increasing nutrient needs (acute illness- HISTORY:  Appetite: poor  Intake: 0% or a few bites  Intake Meeting Needs: will add ONS to help maximize kcal/protein intakes  Food Allergies: No  Cultural/Ethnic/Mu-ism Preferences Addresses: Yes    NUTRITION RELATED PHYSICAL FINDINGS:  Unable to condu

## 2020-09-22 NOTE — PLAN OF CARE
Assumed care after RN report; pt resting in bed. A&Ox3; follows commands. Febrile. SR per monitor; BP stable. 7Lhfnc; tolerating. London in place w/ adequate output. Denies pain. See flowsheets. Daughter Goldie Hamilton updated on POC via phone. Tx orders in place.

## 2020-09-22 NOTE — CM/SW NOTE
Care Progression Note:  Active Acute Medical Issue:   74 y/o female with acute hypoxemic respiratory failure due to COVID-19 pneumonia, currently on 15L HF NC, self proning as able, weaning oxygen as tolerated  S/p CCP on 9/19, on decadron  Possible sepsis

## 2020-09-22 NOTE — PROGRESS NOTES
09/22/20 0540   BiPAP   $ RT Standby Charge (per 15 min) 1   $ Vent Care / Non-Invasive Subsequent Day Yes   Device V60   BiPAP / CPAP CE# [id number]   Mode Spontaneous/Timed   Interface Full face mask   Mask Size Small   Control Settings   Set Rate 16 breath

## 2020-09-22 NOTE — PLAN OF CARE
RECEIVED PT FOLLOWING AM REPORT. MONITOR BS QID. REQUIRED BIPAP OVERNIGHT-TRANSITIONED TO HFNC PER RT. TOLERATING NOW, WILL CONTINUE TO MONITOR. PTOT ON CONSULT. CTA OF CHEST TODAY. NO SOB.  FACETIME WITH FAMILY THIS AFTERNOON AND PT MOOD NOTICEABLY IMPROVE

## 2020-09-22 NOTE — PROGRESS NOTES
[facility]  Progress Note     Solis Phillips Patient Status:  Inpatient    10/16/1945 MRN [id number]   [facility] 4SW-A Attending Lizeth Aguilera MD   Hosp Day # 5 PCP No primary care provider on file.      Chief Complaint: sob fever 6.4  --   --  5.8*       Estimated Creatinine Clearance: 25.6 mL/min (A) (based on SCr of 1.57 mg/dL (H)).     Recent Labs   Lab 09/17/20 1933   PTP 13.4   INR 0.99       Recent Labs   Lab 09/17/20 1933 09/18/20  0030 09/18/20  0434   TROP <0.045 <0.045 <

## 2020-09-22 NOTE — PROGRESS NOTES
[facility]  Nephrology Progress Note    Zoe Son Patient Status:  Inpatient    10/16/1945 MRN [id number]   [facility] 4SW-A Attending Chalino Brady MD   Hosp Day # 5 PCP No primary care provider on file.        SUBJECTIVE:    On Bi Oral, Q6H PRN    •  docusate sodium (COLACE) cap 100 mg, 100 mg, Oral, BID    •  PEG 3350 (MIRALAX) powder packet 17 g, 17 g, Oral, Daily PRN    •  bisacodyl (DULCOLAX) rectal suppository 10 mg, 10 mg, Rectal, Daily PRN          Physical Exam:   /79 COVID. On meropenem/decadron. ID following  - more O2 need; started on bipap    3. HTN- stable; cpm     4  hypernatremia; improved w/ hypotonic fluids; monitor    5.  Hypokalemia - replace    CC: 30 m    Adriane Nagel  9/22/2020

## 2020-09-22 NOTE — PROGRESS NOTES
Increased O2 needs overnight with IWOB. ABG checked 15L HFNC: 7.48, 22, 67, 16 w/ be -6.1. BIPAP to be reapplied to decrease WOB. AM PCXR pending.   CC[facility] updated    JEANNE Waters  Critical Care NP  Melissa [phone]  Pgr: [phone]

## 2020-09-23 NOTE — PROGRESS NOTES
[facility]  Progress Note     Solis Phillips Patient Status:  Inpatient    10/16/1945 MRN [id number]   [facility] 4SW-A Attending Lizeth Aguilera MD   Hosp Day # 6 PCP No primary care provider on file.      Chief Complaint: sob fever --   --  47* 32   ALT 18  --   --  28 27   BILT 0.5  --   --  0.6 0.7   TP 6.4  --   --  5.8* 6.3*    < > = values in this interval not displayed. Estimated Creatinine Clearance: 26.6 mL/min (A) (based on SCr of 1.51 mg/dL (H)).     Recent Labs   Lab

## 2020-09-23 NOTE — PROGRESS NOTES
[facility]                INFECTIOUS DISEASE PROGRESS NOTE    Tee Guardian Patient Status:  Inpatient    10/16/1945 MRN [id number]   [facility] 4SW-A Attending Carlos Lopez MD   Hosp Day # 6 PCP No primary care provider on file. 2.2* 2.1*      < > 147* 144 144   K 4.8   < > 3.8 3.5 3.9   *   < > 120* 119* 119*   CO2 21.0   < > 22.0 19.0* 19.0*   ALKPHO 68  --   --  88 106   AST 27  --   --  47* 32   ALT 18  --   --  28 27   BILT 0.5  --   --  0.6 0.7   TP 6.4  --   --

## 2020-09-23 NOTE — PROGRESS NOTES
[facility]  Progress Note    Enzo Chao Patient Status:  Inpatient    10/16/1945 MRN [id number]   [facility] 4SW-A Attending Karyn Serrato MD   Hosp Day # 6 PCP No primary care provider on file.      Subjective:  Enoz Chao is 09/22/20  0453 09/23/20  0513   * 85 122*   BUN 44* 33* 28*   CREATSERUM 1.89* 1.57* 1.51*   GFRAA 29* 37* 39*   GFRNAA 26* 32* 34*   CA 7.8* 7.9* 8.3*   * 144 144   K 3.8 3.5 3.9   * 119* 119*   CO2 22.0 19.0* 19.0*     Lab Results   Co resolved  · Acute on chronic renal failure-hold on further diuresis  · Elevated inflammatory markers and d dimer--increasing numbers will expand anticoagulation as d-dimer is rising  · Anemia, normocytic  · DM  · HTN    Plan:  · Continue close monitoring o

## 2020-09-23 NOTE — PLAN OF CARE
Alert and oriented x 3, Japanese speaking only, Japanese speaking staff available as  through the night. High Flow O2 at 10 L per min. Has to increase before MN because se was desaturating.  Short of breath noted during slight exertion and desatura

## 2020-09-23 NOTE — PROGRESS NOTES
[facility]  Nephrology Progress Note    Chacorta Marquez Patient Status:  Inpatient    10/16/1945 MRN [id number]   [facility] 4SW-A Attending Zoe Moore MD   Hosp Day # 6 PCP No primary care provider on file.        SUBJECTIVE:  Remains 7. 8* 7.9* 8.3*   * 184* 120* 85 122*       Recent Labs   Lab 09/17/20 1933 09/17/20 1933 09/18/20 0434 09/19/20 0415 09/20/20 0443 09/22/20 0453 09/23/20  0513   ALT 23  --  18 18  --  28 27   AST 53*  --  29 27  --  47* 32   ALB 2.8*  --  2.2 Subcutaneous, Q8H [address]    •  acetaminophen (TYLENOL) tab 650 mg, 650 mg, Oral, Q6H PRN    •  docusate sodium (COLACE) cap 100 mg, 100 mg, Oral, BID    •  PEG 3350 (MIRALAX) powder packet 17 g, 17 g, Oral, Daily PRN    •  bisacodyl (DULCOLAX) rectal supposit

## 2020-09-23 NOTE — PLAN OF CARE
Received pt 0730 on 15L HF NC, per Dr. Caban Arts pt should be self proning and have increased po intake, other wise ngt will have to be placed for nutrition. Attempting to push po intake, slight improvement from previous. Family updated on poc/possible ngt.

## 2020-09-24 NOTE — PLAN OF CARE
Patient is at best a/ox2 to self and location, a bit disoriented to time and situation. VSS. On 10L NC most of shift. Weaning as tolerated.  Patient continued to have poor intake NOC, educated patient at bedside if she will not attempt to eat and drink, the

## 2020-09-24 NOTE — DIETARY NOTE
Clinical Nutrition    3 Day Calorie Count initiated (9/23, 9/24, and 9/25)- envelope hanging on door and will be monitored daily by RD.     Diet Order: Regular  Oral Nutrition Supplements: Straw Ensure Enlive at breakfast, Chidi Ensure High Protein at lunch,

## 2020-09-24 NOTE — PROGRESS NOTES
[facility]  Nephrology Progress Note    Kareen Rodriguez Patient Status:  Inpatient    10/16/1945 MRN [id number]   [facility] 4SW-A Attending Yun Cross MD   Hosp Day # 7 PCP No primary care provider on file.        SUBJECTIVE:  Stable 2.27* 1.89* 1.57* 1.51* 1.42*   CA 8.2* 7.8* 7.9* 8.3* 8.5   * 120* 85 122* 179*       Recent Labs   Lab 09/18/20  0434 09/19/20  0415 09/20/20  0443 09/22/20  0453 09/23/20  0513 09/24/20  0516   ALT 18 18  --  28 27 25   AST 29 27  --  47* 32 27 (TYLENOL) tab 650 mg, 650 mg, Oral, Q6H PRN    •  docusate sodium (COLACE) cap 100 mg, 100 mg, Oral, BID    •  PEG 3350 (MIRALAX) powder packet 17 g, 17 g, Oral, Daily PRN    •  bisacodyl (DULCOLAX) rectal suppository 10 mg, 10 mg, Rectal, Daily PRN

## 2020-09-24 NOTE — PROGRESS NOTES
[facility]  Progress Note    Peg Huffman Patient Status:  Inpatient    10/16/1945 MRN [id number]   [facility] 4SW-A Attending Jose George MD   Hosp Day # 7 PCP No primary care provider on file.      Subjective:  Peg Huffman is Labs   Lab 09/22/20  0453 09/23/20  0513 09/24/20  0516   GLU 85 122* 179*   BUN 33* 28* 34*   CREATSERUM 1.57* 1.51* 1.42*   GFRAA 37* 39* 42*   GFRNAA 32* 34* 36*   CA 7.9* 8.3* 8.5    144 144   K 3.5 3.9 4.5   * 119* 118*   CO2 19.0* 19.0* 2 acidosis resolved  · Acute on chronic renal failure-hold on further diuresis  · Elevated inflammatory markers and d dimer--increasing numbers will expand anticoagulation as d-dimer is rising  · Anemia, normocytic  · DM  · HTN    Plan:  · Continue close mon

## 2020-09-24 NOTE — PLAN OF CARE
Assumed care of pt at 1900. Pt alert Cambodian speaking only able to tell you name, , the month and year. Pt is hard of hearing, soft spoken, and her speech can be delayed. Pt continues to be on 15L HF NC. Lung sounds are diminished. NSR. Afebrile.  Pt is

## 2020-09-24 NOTE — PROGRESS NOTES
[facility]  Progress Note     Tee Lewis Patient Status:  Inpatient    10/16/1945 MRN [id number]   [facility] 4SW-A Attending Carlos Lopez MD   Hosp Day # 7 PCP No primary care provider on file.      Chief Complaint: sob    S: Pa mg/dL (H)). Recent Labs   Lab 09/17/20 1933   PTP 13.4   INR 0.99       Recent Labs   Lab 09/17/20  1933 09/18/20  0030 09/18/20  0434   TROP <0.045 <0.045 <0.045     --   --             Imaging: Imaging data reviewed in Epic.     Medications:   •

## 2020-09-24 NOTE — PROGRESS NOTES
[facility]                INFECTIOUS DISEASE PROGRESS NOTE    Gustavo Bryan Patient Status:  Inpatient    10/16/1945 MRN [id number]   [facility] 4SW-A Attending Neris Perez MD   Hosp Day # 7 PCP No primary care provider on file. 27 25   BILT 0.6 0.7 0.4   TP 5.8* 6.3* 6.0*       No results found for: [facility] Encounter on 09/17/20   1.  BLOOD CULTURE     Status: None    Collection Time: 09/17/20  8:30 PM    Specimen: Blood,peripheral   Result Value Ref Range

## 2020-09-25 NOTE — PROGRESS NOTES
[facility]  Nephrology Progress Note    Tee Guardian Patient Status:  Inpatient    10/16/1945 MRN [id number]   [facility] 4SW-A Attending Carlos Lopez MD   Hosp Day # 8 PCP No primary care provider on file.        SUBJECTIVE:  Stable Recent Labs   Lab 09/19/20  0415 09/20/20  0443 09/22/20  0453 09/23/20  0513 09/24/20  0516 09/25/20  0500   ALT 18  --  28 27 25  --    AST 27  --  47* 32 27  --    ALB 2.2*  --  2.2* 2.1* 1.9*  --    * 354* 455* 438* 375* 360*       Recent mg, Oral, BID    •  PEG 3350 (MIRALAX) powder packet 17 g, 17 g, Oral, Daily PRN    •  bisacodyl (DULCOLAX) rectal suppository 10 mg, 10 mg, Rectal, Daily PRN          Impression/Plan:         #1.   RAPHAEL/CKD III- has had longstanding CKD due to DM/HTN with b

## 2020-09-25 NOTE — PROGRESS NOTES
[facility]                INFECTIOUS DISEASE PROGRESS NOTE    Esther Serge Patient Status:  Inpatient    10/16/1945 MRN [id number]   [facility] 4SW-A Attending Karol Guthrie MD   Hosp Day # 8 PCP No primary care provider on file. Result Value Ref Range    Blood Culture Result No Growth 5 Days N/A         Radiology    CXR reviewed    Problem list reviewed:  Patient Active Problem List:     Hyperkalemia     Anemia     Normal anion gap metabolic acidosis     Metabolic alkalosis

## 2020-09-25 NOTE — PROGRESS NOTES
[facility]  Progress Note    Peg Huffman Patient Status:  Inpatient    10/16/1945 MRN [id number]   [facility] 4SW-A Attending Jose George MD   Hosp Day # 8 PCP No primary care provider on file.      Subjective:  Peg Huffman is BUN 28* 34* 47*   CREATSERUM 1.51* 1.42* 1.45*   GFRAA 39* 42* 41*   GFRNAA 34* 36* 35*   CA 8.3* 8.5 8.7    144 143   K 3.9 4.5 4.5   * 118* 114*   CO2 19.0* 21.0 22.0     Lab Results   Component Value Date    INR 0.99 09/17/2020     COVID-1 days  · CTA negative for PE but with +GGO/consolidative opacities  · Wean o2 for sats >89%, 10L presently  · Continue steroids to BID, day 8 of 10   · Again reinforced with patient to rotate/self prone   · Continue to monitor inflammatory markers and d dim

## 2020-09-25 NOTE — PLAN OF CARE
Received patient at 0700. Alert, oriented to self and date. Fatigued and withdrawn.  line used for communication. Denies pain. Poor appetite but attempting to eat food brought from home and dietary supplements. London catheter in place.  Weaning O

## 2020-09-25 NOTE — PHYSICAL THERAPY NOTE
PHYSICAL THERAPY TREATMENT NOTE - INPATIENT    Room Number: 458/458-A     Session: 1   Number of Visits to Meet Established Goals: 6    Presenting Problem: COVID PNA     History related to current admission: admitted with 1 month history of  Cough, now wi per minute): 15        AM-PAC '6-Clicks' INPATIENT SHORT FORM - BASIC MOBILITY  How much difficulty does the patient currently have. ..  -   Turning over in bed (including adjusting bedclothes, sheets and blankets)?: A Lot   -   Sitting down on and standing completed include AMPAC. These impairments and comorbidities manifest themselves as functional limitations in independent bed mobility, transfers, and gait and stairs.   The patient is below baseline and would benefit from skilled inpatient PT to address t

## 2020-09-25 NOTE — PROGRESS NOTES
[facility]  Progress Note     Syeda Mayers Patient Status:  Inpatient    10/16/1945 MRN [id number]   [facility] 4SW-A Attending Romario Chong MD   Hosp Day # 8 PCP No primary care provider on file.      Chief Complaint: sob fever an hours. No results for input(s): TROP, CK in the last 168 hours. Imaging: Imaging data reviewed in Epic.     Medications:   • dexamethasone Sodium Phosphate  6 mg Intravenous Q12H   • enoxaparin  0.5 mg/kg Subcutaneous Q12H [address]   • Losartan Monica

## 2020-09-25 NOTE — PLAN OF CARE
Assumed care of pt at 1900  Pt A&Ox3 able to tell you name, , month, and year. Pt is disoriented to situation and place. Pt is only Armenian speaking. Seemed to be more awake during assessments. Continues to have diminished appetite.  Educated pt on tryin

## 2020-09-26 NOTE — PROGRESS NOTES
[facility]                INFECTIOUS DISEASE PROGRESS NOTE    Merwyn Six Patient Status:  Inpatient    10/16/1945 MRN [id number]   [facility] 4SW-A Attending Ann Mo MD   Hosp Day # 9 PCP No primary care provider on file. 09/17/20   1.  BLOOD CULTURE     Status: None    Collection Time: 09/17/20  8:30 PM    Specimen: Blood,peripheral   Result Value Ref Range    Blood Culture Result No Growth 5 Days N/A         Radiology    CXR reviewed    Problem list reviewed:  Patient Acti

## 2020-09-26 NOTE — PROGRESS NOTES
[facility]  Nephrology Progress Note    Pratima Norman Attending:  Clarissa Suarez MD       Assessment and Plan:    1) RAPHAEL- due to hypovolemia / sepsis in setting of COVID pneumonia- resolved.     2) CKD 3- baseline Cr 1.8 mg/dl due to longstanding DM / H Or    •  Glucose-Vitamin C (DEX-4) chewable tab 8 tablet, 8 tablet, Oral, Q15 Min PRN    •  Insulin Aspart Pen (NOVOLOG) 100 UNIT/ML flexpen 1-5 Units, 1-5 Units, Subcutaneous, TID CC and HS    •  dexamethasone Sodium Phosphate (DECADRON) 4 MG/ML injection discussed with patient and/or family by bedside.           Qamar Alonso  9/26/2020  7:05 AM

## 2020-09-26 NOTE — PROGRESS NOTES
[facility]  Progress Note     Radha Bearden Patient Status:  Inpatient    10/16/1945 MRN [id number]   [facility] 4SW-A Attending Jayna Trujillo MD   Hosp Day # 9 PCP No primary care provider on file.      Chief Complaint: sob  cp    S (based on SCr of 1.47 mg/dL (H)). Recent Labs   Lab 09/26/20  0440   PTP 15.5*   INR 1.19*       No results for input(s): TROP, CK in the last 168 hours. Imaging: Imaging data reviewed in Epic.     Medications:   • Insulin Aspart Pen  1-5 Units S

## 2020-09-26 NOTE — PLAN OF CARE
Received pt sitting up in the bed, awake, Macedonian speaking, knows few english words, denies pain, follows all simple commands, moves all extremities equally.  Said she has no appetite, ate only magic cup dessert and had few sips of water, pt encouraged to d

## 2020-09-26 NOTE — PROGRESS NOTES
[facility]  Progress Note    Nia Peters Patient Status:  Inpatient    10/16/1945 MRN [id number]   [facility] 4SW-A Attending Burgess José MD   Hosp Day # 9 PCP No primary care provider on file.      Subjective:  Nia Peters is 4.5 4.4   * 114* 114*   CO2 21.0 22.0 23.0     Lab Results   Component Value Date    INR 1.19 (H) 09/26/2020    INR 0.99 09/17/2020     COVID-19 Lab Results    COVID-19  Lab Results   Component Value Date    COVID19 Detected (A) 09/17/2020       Pro- +GGO/consolidative opacities  · Wean o2 for sats >89%, 10L presently  · Continue steroids to BID, day 9   · Again reinforced with patient to rotate/self prone   · Continue to monitor inflammatory markers and d dimer  · BLE duplex negative, CTA negative for

## 2020-09-26 NOTE — PLAN OF CARE
Problem: METABOLIC/FLUID AND ELECTROLYTES - ADULT  Goal: Glucose maintained within prescribed range  Description: INTERVENTIONS:  - Monitor Blood Glucose as ordered  - Assess for signs and symptoms of hyperglycemia and hypoglycemia  - Administer ordered

## 2020-09-26 NOTE — PROGRESS NOTES
Assumed care at 0700. Patient is alert, WILFRID her orientation due to language barrier. She knows some [place] words. Blood sugars elevated. Will continue to monitor. Helped feed her some bites of breakfast. She drank her Ensure.   Needs encouragement to eat

## 2020-09-27 NOTE — PLAN OF CARE
Patient care assumed 1900 in bed with HFNC @ 13L/min able to be reduced to 10L/min. London in place with minimal urine output. Needing lots of encouragement for PO intake. Diminished lung sounds. Able to move well in bed.  Did not prone overnight but was diego

## 2020-09-27 NOTE — PROGRESS NOTES
[facility]  Nephrology Progress Note    Cindy Thorne Attending:  Jono Madden MD       Assessment and Plan:    1) RAPHAEL- due to hypovolemia / sepsis in setting of COVID pneumonia- resolved; azotemia due to steroids- expected.      2) CKD 3- baseline Cr chewable tab 8 tablet, 8 tablet, Oral, Q15 Min PRN    •  Insulin Aspart Pen (NOVOLOG) 100 UNIT/ML flexpen 1-5 Units, 1-5 Units, Subcutaneous, TID CC and HS    •  dexamethasone Sodium Phosphate (DECADRON) 4 MG/ML injection 6 mg, 6 mg, Intravenous, Q12H    • bedside.           Qamar Alonso  9/27/2020  644 AM

## 2020-09-27 NOTE — PROGRESS NOTES
[facility]  Progress Note     Benson Adriel Patient Status:  Inpatient    10/16/1945 MRN [id number]   [facility] 4SW-A Attending Inga Hernandez MD   Hosp Day # 10 PCP No primary care provider on file.      Chief Complaint: sob    S: P TP 5.8* 6.3* 6.0*  --   --   --        Estimated Creatinine Clearance: 26.6 mL/min (A) (based on SCr of 1.51 mg/dL (H)). Recent Labs   Lab 09/26/20  0440   PTP 15.5*   INR 1.19*       No results for input(s): TROP, CK in the last 168 hours.          Im

## 2020-09-27 NOTE — PROGRESS NOTES
[facility]  Progress Note    Gustavo Adams Patient Status:  Inpatient    10/16/1945 MRN [id number]   [facility] 4SW-A Attending Puja Lonog MD   Hosp Day # 10 PCP No primary care provider on file.      Subjective:  Gustavo Adams i 53*   CREATSERUM 1.45* 1.47* 1.51*   GFRAA 41* 40* 39*   GFRNAA 35* 35* 34*   CA 8.7 8.8 8.6    143 143   K 4.5 4.4 4.4   * 114* 114*   CO2 22.0 23.0 25.0     Lab Results   Component Value Date    INR 1.19 (H) 09/26/2020    INR 0.99 09/17/2020 normocytic  · DM  · HTN    Plan:  · Continue close monitoring of respiratory status  · Completed empiric abx, meropenem x 5 days  · CTA negative for PE but with +GGO/consolidative opacities  · Wean o2 for sats >89%, 10L presently  · Continue steroids BID,

## 2020-09-27 NOTE — PROGRESS NOTES
Received this am alert and oriented  South Korean speaking only  Lungs diminished bilaterally  desats to mid 80's with minimal activity,titrated up to 15 L   Educated on importance of proning, used. Patient refusing to prone. Very poor appetite.   Ne

## 2020-09-28 NOTE — PALLIATIVE CARE NOTE
Consult requested by Dr. Brary Valdez, and case reviewed with him this AM.    If schedule permits, will attempt consult today. Otherwise, will contact pt/family to arrange consult tomorrow, Tuesday, 9/29.       Thank you for the referral.      JEANNE Graham

## 2020-09-28 NOTE — PROGRESS NOTES
[facility]  Progress Note     Chacorta Marquez Patient Status:  Inpatient    10/16/1945 MRN [id number]   [facility] 4SW-A Attending Zoe Moore MD   Hosp Day # 11 PCP No primary care provider on file.      Chief Complaint: sob fever AST 32 27  --   --   --  19   ALT 27 25  --   --   --  49   BILT 0.7 0.4  --   --   --  0.5   TP 6.3* 6.0*  --   --   --  7.2    < > = values in this interval not displayed.        Estimated Creatinine Clearance: 27 mL/min (A) (based on SCr of 1.49 mg/dL

## 2020-09-28 NOTE — PHYSICAL THERAPY NOTE
PHYSICAL THERAPY TREATMENT NOTE - INPATIENT    Room Number: 458/458-A     Session: 2   Number of Visits to Meet Established Goals: 6     History related to current admission: admitted with 1 month history of  Cough, now with SOB and fever placed on BIPAP flow (liters per minute): 17(100% vapotherm)        AM-PAC '6-Clicks' INPATIENT SHORT FORM - BASIC MOBILITY  How much difficulty does the patient currently have. ..  -   Turning over in bed (including adjusting bedclothes, sheets and blankets)?: A Lot   - maximize functional mobility skills prior to return home. DISCHARGE RECOMMENDATIONS  PT Discharge Recommendations: Sub-acute rehabilitation(ELOS 11-13 days)     PLAN  PT Treatment Plan: Bed mobility; Endurance; Energy conservation;Patient education; Fami

## 2020-09-28 NOTE — PLAN OF CARE
Problem: Impaired Activities of Daily Living  Goal: Achieve highest/safest level of independence in self care  Description: Interventions:  - Assess ability and encourage patient to participate in ADLs to maximize function  - Promote sittingf position wh

## 2020-09-28 NOTE — OCCUPATIONAL THERAPY NOTE
OCCUPATIONAL THERAPY TREATMENT NOTE - INPATIENT     Room Number: 458/458-A  Session: 1   Number of Visits to Meet Established Goals: 7    Presenting Problem: [id number]; pneumonia    History related to current admission: admitted with 1 month history of  Cou Bathing (including washing, rinsing, drying)?: A Lot  -   Toileting, which includes using toilet, bedpan or urinal? : A Lot  -   Putting on and taking off regular upper body clothing?: A Lot  -   Taking care of personal grooming such as brushing teeth?: A Device Recommendations: TBD    PLAN  OT Treatment Plan: Balance activities; Energy conservation/work simplification techniques;ADL training;Functional transfer training;UE strengthening/ROM; Endurance training;Cognitive reorientation;Patient/Family education

## 2020-09-28 NOTE — PROGRESS NOTES
[facility]  Progress Note    Kareen Rodriguez Patient Status:  Inpatient    10/16/1945 MRN [id number]   [facility] 4SW-A Attending Grecia Cat MD   Hosp Day # 6 PCP No primary care provider on file.      Subjective:  Kareen Rodriguez i Lab 09/26/20  0440 09/27/20  0428 09/28/20  0456   * 197* 157*   BUN 48* 53* 45*   CREATSERUM 1.47* 1.51* 1.49*   GFRAA 40* 39* 39*   GFRNAA 35* 34* 34*   CA 8.8 8.6 9.3    143 145   K 4.4 4.4 4.4   * 114* 116*   CO2 23.0 25.0 27.0 markers and d dimer--increasing numbers will expand anticoagulation as d-dimer is rising  · Anemia, normocytic  · DM  · HTN    Plan:  · Continue close monitoring of respiratory status  · Completed empiric abx, meropenem x 5 days  · CTA negative for PE but

## 2020-09-28 NOTE — CM/SW NOTE
Care Progression Note:  Active Acute Medical Issue:   76 y/o female with acute hypoxemic respiratory failure due to COVID-19 pneumonia, was on vapotherm overnight 100%,40L; self proning as able, weaning oxygen as tolerated  S/p CCP on 9/19, on decadron  Po

## 2020-09-28 NOTE — PROGRESS NOTES
[facility]  Progress Note     Gustavo Adams Patient Status:  Inpatient    10/16/1945 MRN [id number]   [facility] 4SW-A Attending Neris Perez MD   Hosp Day # 11 PCP No primary care provider on file.      Chief Complaint: sob    S: P BILT 0.7 0.4  --   --   --  0.5   TP 6.3* 6.0*  --   --   --  7.2    < > = values in this interval not displayed. Estimated Creatinine Clearance: 27 mL/min (A) (based on SCr of 1.49 mg/dL (H)).     Recent Labs   Lab 09/26/20  0440 09/28/20  0456   P

## 2020-09-28 NOTE — RESPIRATORY THERAPY NOTE
Called by RN to assess patient. Saturations 85-88% on 15L HFNC. Patient placed on BiPAP 10/5 70%. Saturations improved, however patient did not tolerate. Patient switched to Vapotherm 30L 100%. No increased WOB or SOB. Wean FiO2 as tolerated.   Will c

## 2020-09-28 NOTE — PLAN OF CARE
Pt received in bed using vapotherm. Able to wean patient down to 10L nasal cannula. [place] w/adequate output. Patient could not participate in eating so Dobhoff inserted and tube feedings begun. Face time with family.    Family asked for the patient to

## 2020-09-28 NOTE — DIETARY NOTE
[facility]    NUTRITION FOLLOW UP ASSESSMENT    NUTRITION DIAGNOSIS/PROBLEM:    Inadequate energy intake related to poor appetite and physiologic causes increasing nutrient needs (acute illness- COVID-19) as evidenced by consult for low calorie intake DM.    ANTHROPOMETRICS:  Ht: 160 cm (5' 3\")  Wt: 56.7 kg (125 lb). This is 108% of IBW  BMI: Body mass index is 22.14 kg/m². IBW: 52.3 kg    WEIGHT HISTORY: Noted no wt hx per EMR.     Patient Weight for the past 72 hrs:   Weight   09/17/20 1854 65.8 kg (

## 2020-09-28 NOTE — PLAN OF CARE
Assumed care of pt at [address] [place] [address]. Pt resting in bed, VSS. On 10-15L HFNC overnight, titrated appropriately. Pt refuses prone positioning overnight. Denies pain. Alert and oriented x4. Follows commands. SEGUNDO. Pt is strictly Nepali speaking.   used as brenden

## 2020-09-29 PROBLEM — Z51.5 PALLIATIVE CARE ENCOUNTER: Status: ACTIVE | Noted: 2020-01-01

## 2020-09-29 PROBLEM — Z71.89 COUNSELING REGARDING ADVANCE CARE PLANNING AND GOALS OF CARE: Status: ACTIVE | Noted: 2020-01-01

## 2020-09-29 NOTE — PROGRESS NOTES
[facility] Pharmacy Note: Route Optimization for Dexamethasone (Decadron)    Patient is currently on Dexamethasone (Decadron) 6 mg IV every 12 hours.    The patient meets the criteria to convert to the oral equivalent as established by the IV to Oral conversion pr

## 2020-09-29 NOTE — CONSULTS
120 [facility] Patient Status:  Inpatient    10/16/1945 MRN [id number]   [facility] 4SW-A Attending Shelly Patino MD   Hosp Day # 12 PCP No primary care provider on file.      Date of Consul Marital Status:    Children: 2 daughters listed on ADT. Living Situation Prior to Admit: home with family. Is patient confused?  Rose Mary Lieberman - not responding appropriately per jacob CARNEY and RN  Occupational History: deferred,  alex  [employer] affiliation: [employer]  •  sodium chloride 0.9% IV bolus 500 mL, 500 mL, Intravenous, PRN  •  acetaminophen (TYLENOL) tab 650 mg, 650 mg, Oral, Q6H PRN  •  PEG 3350 (MIRALAX) powder packet 17 g, 17 g, Oral, Daily PRN  •  bisacodyl (DULCOLAX) rectal suppository 10 mg, 10 Chemistry:  Lab Results   Component Value Date    CREATSERUM 1.56 (H) 09/29/2020    BUN 55 (H) 09/29/2020     (H) 09/29/2020    K 4.7 09/29/2020     (H) 09/29/2020    CO2 25.0 09/29/2020     (H) 09/29/2020    CA 9.1 09/29/2020    A Disease  Can't do any work  coma  Max Assist  Total Care Mouth care Drowsy or coma   0 Death     Palliative Care Assessment       Goals of Care Counseling/Discussion:    I spoke with pt's dtr Adriana Dale by phone to introduce palliative care and reason for cons nurses are able to facilitate, and requests that this continue to be offered to them. Also requests to explore possibility for one family member to make an in-person visit to pt while decisions are being made.    A dtr was COVID+, and family members may res about sharing medical information: ok to speak to dtr [place]. Patient's decision making preferences: Unable, as above. Psychosocial: Emotional support provided to pt's dtr [place] by phone.     Spiritual needs addressed:  Deferred, alex    Disposition:  O pending course. A total of 70 minutes were spent on this consult, which included all of the following:  Direct face-to-face contact, history taking, physical examination, and > 50% was spent counseling and coordinating care.     I reviewed the above wi

## 2020-09-29 NOTE — PLAN OF CARE
Pt alert, Senegalese speaking. Language line at bedside. Follows commands. Denies pain. Nauseous this AM. Orders obtained for PRN zofran. NPO status per/calorie count discontinued per CC MD orders. VSS. NSR observed to bedside monitor.  Vapotherm 25L/100% plac

## 2020-09-29 NOTE — PLAN OF CARE
Assumed care at [address] pt on bed, on O2 10L/HF sats  >94%  Drowsy, open eyes and said \"yes\" and smile when asked if she's ok. Follows simple commands  Tube feeding via Dobhoff, tolerating, no residuals noted. No fever, vital sign stable.   Able to lay

## 2020-09-29 NOTE — PROGRESS NOTES
[facility]  Progress Note    Nia Peters Patient Status:  Inpatient    10/16/1945 MRN [id number]   [facility] 4SW-A Attending Burgess José MD   Hosp Day # 12 PCP No primary care provider on file.      Subjective:  Nia Peters i 29.3 29.5   MCHC 32.1 32.6 31.7 30.4*   RDW 15.7* 15.9* 16.0* 16.0*   NEPRELIM 6.50 6.64 9.76*  --    WBC 7.3 7.3 10.7 9.4   .0 172.0 251.0 250.0       Recent Labs   Lab 09/27/20  0428 09/28/20  0456 09/29/20  0515   * 157* 233*   BUN 53* 45* acidosis resolved  · Acute on chronic renal failure-hold on further diuresis  · Elevated inflammatory markers and d dimer--increasing numbers will expand anticoagulation as d-dimer is rising  · Anemia, normocytic  · DM  · HTN    Plan:  · Continue close mon

## 2020-09-29 NOTE — PROGRESS NOTES
[facility]                INFECTIOUS DISEASE PROGRESS NOTE    Berneda Render Patient Status:  Inpatient    10/16/1945 MRN [id number]   [facility] 4SW-A Attending Traci Ortega MD   Hosp Day # 12 PCP No primary care provider on file ALT 25  --   --  49 48   BILT 0.4  --   --  0.5 0.4   TP 6.0*  --   --  7.2 6.7    < > = values in this interval not displayed. No results found for: [address] Encounter on 09/17/20   1.  BLOOD CULTURE     Status: None    Colle

## 2020-09-30 NOTE — DIETARY NOTE
[facility]    NUTRITION FOLLOW UP ASSESSMENT    NUTRITION DIAGNOSIS/PROBLEM:    Inadequate energy intake related to poor appetite and physiologic causes increasing nutrient needs (acute illness- COVID-19) as evidenced by consult for low calorie intake wt hx per EMR.     Patient Weight for the past 72 hrs:   Weight   09/17/20 1854 65.8 kg (145 lb)   09/17/20 2356 56.5 kg (124 lb 9 oz)     Wt Readings from Last 10 Encounters:  09/30/20 : 51.6 kg (113 lb 12.1 oz)    NUTRITION:  Diet: NPO    FOOD/NUTRITION R

## 2020-09-30 NOTE — PHYSICAL THERAPY NOTE
PHYSICAL THERAPY TREATMENT NOTE - INPATIENT    Room Number: 458/458-A     Session: 3  Number of Visits to Meet Established Goals: 6     History related to current admission: admitted with 1 month history of  Cough, now with SOB and fever placed on BIPAP ( tested  Dynamic Standing: Not tested    ACTIVITY TOLERANCE                        O2 WALK                    AM-PAC '6-Clicks' INPATIENT SHORT FORM - BASIC MOBILITY  How much difficulty does the patient currently have. ..  -   Turning over in bed (including flex  Hand open/close     Position Supine     Repetitions   10   Sets   1     Patient End of Session: In bed;Needs met;Call light within reach;RN aware of session/findings; All patient questions and concerns addressed;SCDs in place    ASSESSMENT   Patient w

## 2020-09-30 NOTE — PROGRESS NOTES
[facility] Pharmacy Note:  Renal Dose Adjustment for Enoxaparin (LOVENOX)    Josue Smith has been prescribed Enoxaparin (LOVENOX) 30 mg subcutaneously every 12 hours. Estimated Creatinine Clearance: 24 mL/min (A) (based on SCr of 1.65 mg/dL (H)).     Michael Truong

## 2020-09-30 NOTE — PLAN OF CARE
Assumed care of pt at 1. Pt remains in iso for Covid 19, proper ppe worn. Received pt on Vapotherm 100% Fio2 25L and pt was weaned down to 60% this am but desat to 88% and was unable to increase her sats and had to be increased back to 70% FIO2.  Lungs d

## 2020-09-30 NOTE — PLAN OF CARE
Pt alert. British speaking, language line at bedside. Pt responds yes when  is translating orientation assessment. Pt will state name. Pt is very conversational w/family members during Facetime call this evening. VSS.  Vapotherm in place and titr

## 2020-09-30 NOTE — PROGRESS NOTES
[facility]                INFECTIOUS DISEASE PROGRESS NOTE    Manny Ferguson Patient Status:  Inpatient    10/16/1945 MRN [id number]   [facility] 4SW-A Attending Eze Benedict MD   Hosp Day # 15 PCP No primary care provider on file --       < > 145 149* 145   K 4.5   < > 4.4 4.7 4.8   *   < > 116* 117* 113*   CO2 21.0   < > 27.0 25.0 26.0   ALKPHO 104  --  134 128  --    AST 27  --  19 34  --    ALT 25  --  49 48  --    BILT 0.4  --  0.5 0.4  --    TP 6.0*  --  7.2 6.7  -

## 2020-09-30 NOTE — PROGRESS NOTES
[id number] [facility] 149 Follow Up    Berneda Render Patient Status:  Inpatient    10/16/1945 MRN [id number]   [facility] 4SW-A Attending Gideon Friedman MD   Hosp Day # 15 PCP No primary care provider on file.      Date of visit:  5 Results   Component Value Date    CREATSERUM 1.65 (H) 09/30/2020    BUN 65 (H) 09/30/2020     09/30/2020    K 4.8 09/30/2020     (H) 09/30/2020    CO2 26.0 09/30/2020     (H) 09/30/2020    CA 9.4 09/30/2020    ALB 2.2 (L) 09/29/2020    A Palliative Care Assessment:     Goals of Care Discussion: I called dtpauline Sanders to review discussion from yesterday and provided clinical update. She stated GOC are = POC.      Griffin Vargas said she had a question after researching the Internet about TPN and gap metabolic acidosis     Metabolic alkalosis     Respiratory alkalosis     Community acquired pneumonia     Community acquired pneumonia, unspecified laterality     Hypoxia     Person under investigation for COVID-19     Acute kidney injury superimposed

## 2020-09-30 NOTE — PROGRESS NOTES
[facility]  Progress Note    Julieta Yusuf Patient Status:  Inpatient    10/16/1945 MRN [id number]   [facility] 4SW-A Attending Jenna Perla MD   Hosp Day # 15 PCP No primary care provider on file.      Subjective:  Julieta Yusuf i 15.7* 15.9* 16.0* 16.0* 15.6*   NEPRELIM 6.50 6.64 9.76*  --   --    WBC 7.3 7.3 10.7 9.4 8.3   .0 172.0 251.0 250.0 257.0       Recent Labs   Lab 09/28/20  0456 09/29/20  0515 09/30/20  0455   * 233* 307*   BUN 45* 55* 65*   CREATSERUM 1.49* resolved  · Acute on chronic renal failure-hold on further diuresis  · Elevated inflammatory markers and d dimer--increasing numbers will expand anticoagulation as d-dimer is rising  · Anemia, normocytic  · DM  · HTN    Plan:  · Continue close monitoring o

## 2020-10-01 NOTE — RESPIRATORY THERAPY NOTE
Received pt on Vapotherm, 25L 60%. SpO2 sitting at 90%, increased FiO2 to 70%. Will continue to monitor closely.

## 2020-10-01 NOTE — PROGRESS NOTES
[facility]  Progress Note     Berneda Render Patient Status:  Inpatient    10/16/1945 MRN [id number]   [facility] 4SW-A Attending Traci Ortega MD   Hosp Day # 15 PCP No primary care provider on file.      Chief Complaint: sob    S: P mg/dL (H)). Recent Labs   Lab 09/26/20  0440 09/28/20  0456   PTP 15.5* 14.9*   INR 1.19* 1.13*       No results for input(s): TROP, CK in the last 168 hours. Imaging: Imaging data reviewed in Epic.     Medications:   • [START ON 10/2/2020] Levot

## 2020-10-01 NOTE — PROGRESS NOTES
[facility]                INFECTIOUS DISEASE PROGRESS NOTE    Collin Erika Patient Status:  Inpatient    10/16/1945 MRN [id number]   [facility] 4SW-A Attending Jade Zayas MD   Hosp Day # 15 PCP No primary care provider on file for: [address] Encounter on 09/17/20   1.  BLOOD CULTURE     Status: None    Collection Time: 09/17/20  8:30 PM    Specimen: Blood,peripheral   Result Value Ref Range    Blood Culture Result No Growth 5 Days N/A         Radiology    CX

## 2020-10-01 NOTE — CM/SW NOTE
farrukh spoke to dtr [facility] regarding DC planning. She states that they had initially discussed the GILBERTO recommendation but have not yet made a decision.  Their biggest concerns is that pt will continue to be isolated and become more depressed in a GILBERTO but are al

## 2020-10-01 NOTE — RESPIRATORY THERAPY NOTE
Patient received on vapotherm 25 liters/50%. Needed to increase FIO2 during shift to 60%. Breath sounds- diminished/clear. MDI tx given as ordered.  Reviewed use of MDI with video  but patient is still having a hard time with seal on mouth piece

## 2020-10-01 NOTE — PROGRESS NOTES
[facility]  Progress Note    Peg Huffman Patient Status:  Inpatient    10/16/1945 MRN [id number]   [facility] 4SW-A Attending Lacho Simmons MD   Hosp Day # 15 PCP No primary care provider on file.      Subjective:  Peg Huffman i 25.0 26.0 25.0   BUN 55* 65* 65*   CREATSERUM 1.56* 1.65* 1.53*     Recent Labs   Lab 09/26/20  0440 09/28/20  0456   PTP 15.5* 14.9*   INR 1.19* 1.13*   PTT 36.0 31.8       Cultures: Reviewed    Radiology:  Xr Chest Ap Portable  (cpt=71045)    Result Date transaminases with plans to follow  · Anemia, normocytic  · DM  · HTN    Plan:  · We will recheck LFTs and check ultrasound  · Continue to wean FiO2 as able  · Try to increase activity today    Overall continues to demonstrate slow improvement    CC     Th

## 2020-10-01 NOTE — PLAN OF CARE
Pt alert, confused, oriented to self. Language line at bedside. VSS. Desaturation this afternoon. Vapotherm titrated up, then titrated down this evening. Maintaining spo2 saturations on vapotherm 30L/60%. Shallow breaths w/intermittent tachypnea noted.  Chintan Guillory

## 2020-10-01 NOTE — PLAN OF CARE
Assumed care of Magdalene at [address]. On [place], [place] Precaution for COVID (+). On vapotherm at 60% but approx 0100, increased to 70% FiO2. Continue to be tachypneac and sats decreases to 88% on activity in bed such as turning & change of position.  Dimin

## 2020-10-02 NOTE — PROGRESS NOTES
[facility]                INFECTIOUS DISEASE PROGRESS NOTE    Pratima Norman Patient Status:  Inpatient    10/16/1945 MRN [id number]   [facility] 4SW-A Attending Clarissa Suarez MD   Hosp Day # 15 PCP No primary care provider on file 18.0* 25.0  25.0   ALKPHO 199* 290* 307*   AST 64* 669* 715*   ALT 89* 317* 352*   BILT 0.3 1.8 1.5   TP 6.7 6.4 6.2*       No results found for: [address] Encounter on 09/17/20   1.  BLOOD CULTURE     Status: None    Collection Time:

## 2020-10-02 NOTE — PROGRESS NOTES
[facility]  Nephrology Progress Note    Esther Serge Patient Status:  Inpatient    10/16/1945 MRN [id number]   [facility] 4SW-A Attending Jane Ogden MD   Hosp Day # 15 PCP No primary care provider on file.        SUBJECTIVE:  Ask 46.6*  --    HGB 8.8*   < >  --    < > 9.9* 9.4* 9.3* 7.6* 9.6* 8.8* 8.4*   MCV 92.3   < >  --    < > 97.0 95.3 96.5 101.5*  --  96.2  --    .0   < >  --    < > 250.0 257.0 241.0 406.0  --  444.0  --    BAND  --   --   --   --   --   --   --  23  -- Intravenous, Q24H    •  [START ON 10/8/2020] levothyroxine Sodium (SYNTHROID) 25 mcg in Sodium Chloride 0.9 % IV push, 25 mcg, Intravenous, Daily    •  insulin detemir (LEVEMIR) 100 UNIT/ML flextouch 16 Units, 16 Units, Subcutaneous, Q12H    •  Insulin Asp pneumonia. Creat stable today and hopefully at plateau. Cont gentle IVF and d/c when TPN started    #2. Acute hypoxic resp failure- due to COVID pneumonia as well as superimposed bacterial pneumonia. On steroids, received CCP, remains on david/vanc.  On v

## 2020-10-02 NOTE — RESPIRATORY THERAPY NOTE
Received patient on Vapotherm 30L 60%, tolerating well most of the shift. Patient did have a desaturation episode, with sats in mid 80s on 30L 100%. Attempted BiPAP 12/6 100% but patient did not tolerate.   Patient then turned on left side and switched ba

## 2020-10-02 NOTE — PROGRESS NOTES
[facility]  Progress Note     Alyssa Dyer Patient Status:  Inpatient    10/16/1945 MRN [id number]   [facility] 4SW-A Attending Kartik West MD   Hosp Day # 15 PCP No primary care provider on file.      Chief Complaint: sob    S: P  113* 114*  114*   CO2 25.0 18.0* 25.0  25.0   ALKPHO 199* 290* 307*   AST 64* 669* 715*   ALT 89* 317* 352*   BILT 0.3 1.8 1.5   TP 6.7 6.4 6.2*       Estimated Creatinine Clearance: 16.4 mL/min (A) (based on SCr of 2.44 mg/dL (H)).     Recent Labs discharge?: tbd  Estimated date of discharge: tbd  Discharge is dependent on: course  At this point Ms. Santiago Agrawal is expected to be discharge to: tbd    Plan of care discussed with rn    Shira De Guzman MD

## 2020-10-02 NOTE — DIETARY NOTE
[facility] ASSESSMENT    Pt does not meet malnutrition criteria.     NUTRITION DIAGNOSIS/PROBLEM:    Inadequate energy intake related to physiologic causes increasing nutrient needs (COVID-19) as evidenced by NPO status and consu EVALUATE/NUTRITION GOALS:  1. No signs of skin breakdown (ongoing)  2. Weight maintenance: +1-2 lb throughout length of stay (ongoing)  3. Alternative means of nutrition at goal to meet 100% of patient nutrition prescription  4.  Diet to advance within 48-7

## 2020-10-02 NOTE — CONSULTS
[address] [facility] Note  Automatic IV Levothyroxine Hold Protocol       Levothyroxine 25 mcg IV q24h was ordered by Dr. Puneet Jeffries.     Thyroid:    Lab Results   Component Value Date    TSH 1.850 09/17/2020       The patient meets the criteria to automat

## 2020-10-02 NOTE — PROGRESS NOTES
[facility]  Progress Note    Rakel George Patient Status:  Inpatient    10/16/1945 MRN [id number]   [facility] 4SW-A Attending Fredy Brady MD   Hosp Day # 15 PCP No primary care provider on file.      Subjective:  Rakel George i 31.5   RDW 15.6* 15.9*  --  16.3*   NEPRELIM 10.25* 34.46*  --  43.12*   WBC 10.8 36.7*  --  46.6*   .0 406.0  --  444.0       Recent Labs   Lab 10/01/20  0433 10/01/20  2343 10/02/20  0516   * 235* 112*  112*   BUN 65* 76* 77*  77*   STEFANY Losartan Potassium  25 mg Oral Daily   • Albuterol Sulfate HFA  4 puff Inhalation Q4H [address]   • atorvastatin  20 mg Oral Nightly       Assessment:  · Acute hypoxic respiratory failure due to likely COVID pneumonia  · COVID pneumonia - had direct contact to f with RN, RT, APN. Discussed with family    MD JUAN PABLO Soriano  CCM time: 50 minutes. The patient is critically ill and at high risk for clinical deterioration.

## 2020-10-02 NOTE — PLAN OF CARE
Problem: Diabetes/Glucose Control  Goal: Glucose maintained within prescribed range  Description: INTERVENTIONS:  - Monitor Blood Glucose as ordered  - Assess for signs and symptoms of hyperglycemia and hypoglycemia  - Administer ordered medications to m Noted LA on abg, labs ordered for tonight, reviewed KUB, removed DHT and replaced with salem sump decompressd 250cc unprocessed tube feeding. HR improved and RR improved. Pt started on IVF and and Shiva for hypotension. Daughter Christa Madrid called for update.  All

## 2020-10-02 NOTE — PROGRESS NOTES
XR abdomen negative. Ordered switch NG to salem sump and put to suction. Noted to have tube feed output when placed. Lactic acidosis and lipase of >3,000 on labs, along with a generous increase in her white blood cell count.       -ID called by nurse,

## 2020-10-02 NOTE — PROGRESS NOTES
120 [facility] Dosing Service    Initial Pharmacokinetic Consult for Vancomycin Dosing     Reagan Griffith is a 75year old patient who is being treated for sepsis.   Pharmacy has been asked to dose Vancomycin by Gurmeet Barragan

## 2020-10-02 NOTE — PROGRESS NOTES
[address] [facility] Follow Up    Tee Lewis  [id number]  Patient seen at: [facility]     This patient isCOVID-19+ .  I spoke with the primary referring team. For purposes of responsible PPE use in this time of PPE shortage du Intravenous, Q24H  •  [START ON 10/8/2020] levothyroxine Sodium (SYNTHROID) 25 mcg in Sodium Chloride 0.9 % IV push, 25 mcg, Intravenous, Daily  •  dextrose 10 % infusion, , Intravenous, Continuous PRN  •  adult 3 in 1 tpn, , Intravenous, Continuous TPN  • INR 1.13 (H) 09/28/2020    PTT 31.8 09/28/2020       Chemistry:  Lab Results   Component Value Date    CREATSERUM 2.44 (H) 10/02/2020    CREATSERUM 2.44 (H) 10/02/2020    BUN 77 (H) 10/02/2020    BUN 77 (H) 10/02/2020     (H) 10/02/2020     The right kidney is echogenic, consistent with chronic renal disease without sign of   hydronephrosis.   No definite gallbladder visualization, with anechoic structure anterior to the kidney could reflect a renal cyst arising from the right kidney or the ga Reduced Significant disease  Can't perform hobby Occasional  Assist Normal or   Reduced Full or confused   50 Mainly sit/lie Extensive Disease  Can't do any work   Partial Assist Normal or Reduced Full or confused   40 Mainly in bed Extensive Disease  Can' Problem List   Patient Active Problem List:     Hyperkalemia     Anemia     Normal anion gap metabolic acidosis     Metabolic alkalosis     Respiratory alkalosis     Community acquired pneumonia     Community acquired pneumonia, unspecified laterality

## 2020-10-02 NOTE — PROGRESS NOTES
120 [facility] Dosing Service  Antibiotic Dosing    Radha Bearden is a 75year old for whom pharmacy is dosing Merrem for treatment of  sepsis. .  Other antibiotics Vanco-dosed by pharmacy    Allergies: is allergic to penicillins.     Vitals: BP 97/62   Puls

## 2020-10-03 NOTE — PROGRESS NOTES
Pharmacy Note:  Renal Adjustment for meropenem (MERREM)         Josue Smith is a 75year old female who has been prescribed meropenem 500mg q12hr.       Est CrCl: ~10 mL/min (RAPHAEL, worsening renal fx, may need HD)    The dose has been adjusted to [facility]

## 2020-10-03 NOTE — PROGRESS NOTES
[facility]  Progress Note     Sofia Fly Patient Status:  Inpatient    10/16/1945 MRN [id number]   [facility] 4SW-A Attending Soniya Tyler MD   Hosp Day # 12 PCP No primary care provider on file.      Chief Complaint: sob    S: P BILT 1.8 1.5 1.3   TP 6.4 6.2* 5.3*       Estimated Creatinine Clearance: 10.3 mL/min (A) (based on SCr of 3.92 mg/dL (H)).     Recent Labs   Lab 09/28/20  0456 10/02/20  2051   PTP 14.9* 16.6*   INR 1.13* 1.30*       No results for input(s): TROP, CK in MD

## 2020-10-03 NOTE — PROGRESS NOTES
Reviewed CT results. CONCLUSION:    1. There has been interval development of moderate pneumomediastinum. No mediastinal mass lesions/hematoma.   2. Diffuse ground-glass opacities in a patchy distribution throughout the lungs are again noted and consi

## 2020-10-03 NOTE — PROGRESS NOTES
Request for consultation received from the patient's nurse at the request of the intensivist service and pulmonologist.    Briefly the patient is a 69-year-old female with COVID pneumonia and multiple medical problems.   She has had an extensive hospital co

## 2020-10-03 NOTE — CONSULTS
[facility]  Report of  Surgical Consultation with History and Physical Exam    Rakel George Patient Status:  Inpatient    10/16/1945 MRN [id number]   [facility] 4SW-A Attending Josephine Quiñonez MD   Hosp Day # 16 PCP No primary care p Units/min, Intravenous, Continuous  •  sodium bicarbonate 150mEq in dextrose 5% 1000mL premix infusion, 100 mL/hr, Intravenous, Continuous  •  adult 3 in 1 tpn, , Intravenous, Continuous TPN  •  0.9% NaCl infusion, , Intravenous, Once  •  0.45% NaCl infusi tab 650 mg, 650 mg, Oral, Q6H PRN  •  PEG 3350 (MIRALAX) powder packet 17 g, 17 g, Oral, Daily PRN    Review of Systems:  Pertinent items are noted in HPI. Allergic/Immuno:  Review of patient's allergies performed.   Cardiovascular:  Negative for cool ex 2. 1* 2.1* 1.8*    146*  146* 138   K 6.0* 5.6*  5.6*  5.6* 7.9*   * 114*  114* 108   CO2 18.0* 25.0  25.0 8.0*   ALKPHO 290* 307* 374*   * 715* 527*   * 352* 366*   BILT 1.8 1.5 1.3   TP 6.4 6.2* 5.3*         Recent Labs   Lab 09/ I have I have edited the above to reflect my evaluation, opinion, and physical exam findings. The patient is seen at the request of the primary care team for evaluation of retroperitoneal hematoma.   The patient has an extensive past and recent medical h recommend angiography by interventional radiology with attempts at angioembolization of any bleeding vessels.   · Alternatively, given the patient's overall poor prognosis I would also recommend palliative/comfort care as the patient's likelihood of meaning

## 2020-10-03 NOTE — PROCEDURES
[facility]  Procedure Note    Cindy Clonts Patient Status:  Inpatient    10/16/1945 MRN [id number]   [facility] [address] [facility] Attending Tracy Keith MD   Hosp Day # 16 PCP No primary care provider on file.      Procedure: T

## 2020-10-03 NOTE — ANESTHESIA PROCEDURE NOTES
Arterial Line  Performed by: Mykel Hendricks MD  Authorized by: Mykel Hendricks MD     General Information and Staff    Procedure Start:  10/3/2020 2:05 PM  Procedure End:  10/3/2020 2:10 PM  Anesthesiologist:  Mykel Hendricks MD  Performed By:  Anesthesiologist  Pa

## 2020-10-03 NOTE — PLAN OF CARE
Pt received in bed unable to communicate her needs. Worsening saturations. Chest tube inserted. HD line inserted. Artline inserted. Family updated on pt's poc. Minimal urine output. CRRT begun. Levo/vaso infusing. TPN infusing.  Pt is not able to saturate a

## 2020-10-03 NOTE — PROGRESS NOTES
[facility]  Progress Note    Syeda Mayers Patient Status:  Inpatient    10/16/1945 MRN [id number]   [facility] 4SW-A Attending Agnes Valerio MD   Hosp Day # 12 PCP No primary care provider on file.      Subjective:  Syeda Mayers i 112*  112* 307*   BUN 76* 77*  77* 105*   CREATSERUM 2.42* 2.44*  2.44* 3.92*   GFRAA 22* 22*  22* 12*   GFRNAA 19* 19*  19* 11*   CA 9.0 8.0*  8.0* 7.1*   ALB 2.1* 2.1* 1.8*    146*  146* 138   K 6.0* 5.6*  5.6*  5.6* 7.9*   * 114*  114* 108 uncinate process of the pancreas/pancreatic head and extending inferiorly. This larger hematoma measures approximately 12.3 x   12.7 x 6.2 cm in maximal dimensions    Medications reviewed.     Assessment:  · Acute hypoxic respiratory failure due to likely hypoxia, COVID pneumonia and now with pancreatitis, RP bleed and PTX. Given significant third spacing and renal failure, limited ability to provide IVF.  Will need very close monitoring and fluid balance assessment  · Continue all meds IV  · Follow urine ou

## 2020-10-03 NOTE — PROGRESS NOTES
[facility]  Nephrology Progress Note    Ami Encarnacion Patient Status:  Inpatient    10/16/1945 MRN [id number]   [facility] 4SW-A Attending Joel Tubbs MD   Hosp Day # 12 PCP No primary care provider on file.        SUBJECTIVE:  Rem 10.8 36.7*  --  46.6*  --  38.9* 41.4*   HGB  --    < > 9.3* 7.6* 9.6* 8.8* 8.4* 7.8* 7.3*   MCV  --    < > 96.5 101.5*  --  96.2  --  100.4* 102.5*   PLT  --    < > 241.0 406.0  --  444.0  --  371.0 327.0   BAND  --   --   --  23  --  12  --  37 27   INR morphINE sulfate (PF) 2 MG/ML injection 2 mg, 2 mg, Intravenous, Q2H PRN    •  insulin detemir (LEVEMIR) 100 UNIT/ML flextouch 16 Units, 16 Units, Subcutaneous, Q12H    •  Insulin Aspart Pen (NOVOLOG) 100 UNIT/ML flexpen 4-20 Units, 4-20 Units, Subcutaneou failure- due to COVID pneumonia as well as superimposed bacterial pneumonia. On steroids, received CCP, remains on david/vanc. On vapotherm today, CT noted as well. Per pulm    #3. Pancreatitis- NPO.  stable, follow    #4. RP bleed- CT noted.   lovenox h

## 2020-10-03 NOTE — DIETARY NOTE
[facility]   CLINICAL NUTRITION - TPN FOLLOW UP    Collin James     TPN #2 for tonight to include: 470 dextrose calories, 320 lipid calories (29% lipids), and 75 grams protein.  This provides 1090 calories which is ~78% of the goal. (Same order, did

## 2020-10-03 NOTE — PROGRESS NOTES
[facility]  Nephrology Progress Note    Sofia Fly Patient Status:  Inpatient    10/16/1945 MRN [id number]   [facility] 4SW-A Attending Sara Bryson MD   Hosp Day # 12 PCP No primary care provider on file.        SUBJECTIVE:  Rem --  46.6*  --  38.9* 41.4* 48.5*   HGB  --    < > 7.6*   < > 8.8* 8.4* 7.8* 7.3* 7.0*   MCV  --    < > 101.5*  --  96.2  --  100.4* 102.5* 111.6*   PLT  --    < > 406.0  --  444.0  --  371.0 327.0 367.0   BAND  --   --  23  --  12  --  37 27  --    INR 1.1 25 mcg, Intravenous, Daily    •  dextrose 10 % infusion, , Intravenous, Continuous PRN    •  adult 3 in 1 tpn, , Intravenous, Continuous TPN    •  morphINE sulfate (PF) 2 MG/ML injection 1 mg, 1 mg, Intravenous, Q2H PRN    Or    •  morphINE sulfate (PF) 2 Clinical course c/w ATN in setting of critical illness/RP bleed Will repeat peripheral draw but if accurate will need HD today. #2.  Acute hypoxic resp failure- due to COVID pneumonia as well as superimposed bacterial pneumonia.   On steroids, received

## 2020-10-03 NOTE — PLAN OF CARE
Abdominal discomfort/grimace with palpation. Remains npo, ng to suction, ultrasound abd today  and just completed CT abd, results pending. Picc line today completed to use for TPN to start tonight. [place] placed and renal consulted today.    Using interprete

## 2020-10-03 NOTE — PLAN OF CARE
Patient awake, alert, Swazi speaking. Used hospital  services. Patient c/o abdominal pain Morphine prn given for pain. CT scan result informed APN. Labs sent. Dr Danuta Hernandez informed about new consult.  Face time did with family and patient per fa

## 2020-10-04 NOTE — DISCHARGE SUMMARY
[facility]  DISCHARGE SUMMARY     Ra Simon Patient Status:  Inpatient    10/16/1945 MRN [id number]   [facility] 4SW-A Attending No att. providers found   [facility] Day # 17 PCP No primary care provider on file.      Date of Admissio List:     Discharge Medications      ASK your doctor about these medications      Instructions Prescription details   acetaminophen 500 MG Tabs  Commonly known as: TYLENOL EXTRA STRENGTH      Take 500 mg by mouth every 6 (six) hours as needed for Pain.    R

## 2020-10-04 NOTE — PROGRESS NOTES
10/04/20 0435   Clinical Encounter Type   Visited With Patient not available;Family  ([employer] falls outside room.)   Routine Visit Introduction   Referral From Nurse   Referral To [address] [place] [address]

## 2020-10-04 NOTE — PROGRESS NOTES
[facility]                INFECTIOUS DISEASE PROGRESS NOTE    Cecelia Rao Patient Status:  Inpatient    10/16/1945 MRN [id number]   [facility] 4SW-A Attending Nicolasa Karimi MD   Hosp Day # 12 PCP No primary care provider on file interval not displayed.          Recent Labs   Lab 10/02/20  0516 10/03/20  0544 10/03/20  1409   *  112* 307* 187*   BUN 77*  77* 105* 111*   CREATSERUM 2.44*  2.44* 3.92* 4.25*   GFRAA 22*  22* 12* 11*   GFRNAA 19*  19* 11* 10*   CA 8.0*  8.0* 7.1* 10/1  Continued 02 support per pulm    2. Elevatd LFTS/abdominal pain improved  Pancreatitis  US limited exam  Maintain meropenem. CRRT.        [facility]  [phone]

## 2020-10-04 NOTE — PROGRESS NOTES
Max on 3 vasopressors and not tolerating CRRT even with no fluid removal.  Family updated and want to purse comfort measures. Will await family to arrive to stop Bipap and medications.     -prn lorazepam, robinol and morphine ordered  -stop pressor sup

## 2020-10-04 NOTE — PLAN OF CARE
Received patient on CRRT. Frequent access pressure alarm, tried to adjust lines, flow rate. At 2110 had to stop CRRT. Called [facility]. HD nurse came and restarted CRRT at 2330. On Levo , and Vaso GTT. Started precedex at low rate for restlessness.  Kemar Amezquita

## 2020-10-05 NOTE — PAYOR COMM NOTE
--------------  DISCHARGE REVIEW    Jordan Weston MA [facility]  Subscriber #:  [id number]  Authorization Number: [id number]    Admit date: 9/17/20  Admit time:  2344  Discharge Date: 10/4/2020  6:40 AM     Admitting Physician: Delia Paulino MD  Attending Physicia resulting RP bleed and RAPHAEL/hyperkalemia. Attempted to start dialysis but patient was not able to tolerate CRRT and family converted her to comfort care and she .     Lace+ Score: 49  59-90 High Risk  29-58 Medium Risk  0-28   Low Risk  Patient was refe signs:    -----------------------------------------------------------------------------------------------  PATIENT DISCHARGE INSTRUCTIONS: See electronic chart    Jaden Ho MD    Time spent:  < 30 minutes      Electronically signed by Jah Eugene

## (undated) NOTE — LETTER
[facility] [address], [address]  Consent for Procedure/Sedation    Date: 10/03/2020    Time: 1150      1. I authorize the performance upon Mica Thao the following:  Temporary dialysis catheter insertion.      2. I authorize Printed: 10/3/2020   11:50 AM  Patient Name: Atif Fields        :        Medical Record #: [id number]

## (undated) NOTE — LETTER
1. I authorize the performance upon Tee Lewis the followin. I authorize Dr. _________________________ (and whomever is designated as the doctor’s assistant), to perform the above mentioned procedures.     3. If any unforeseen conditions arise

## (undated) NOTE — LETTER
[address]     I agree to have a Peripherally Inserted Central Catheter (PICC) placed in my arm.    1. The PICC insertion procedure, care, maintenance, risks, benefits, and complications have been explained to me by my physic 7. The person performing this procedure has discussed the potential benefits, risks, and side effects of the PICC; the likelihood of achieving goals; and potential problems that might occur during recuperation.  They also discussed reasonable alternatives t